# Patient Record
Sex: FEMALE | Employment: PART TIME | URBAN - METROPOLITAN AREA
[De-identification: names, ages, dates, MRNs, and addresses within clinical notes are randomized per-mention and may not be internally consistent; named-entity substitution may affect disease eponyms.]

---

## 2021-10-19 ENCOUNTER — OFFICE VISIT (OUTPATIENT)
Dept: INTERNAL MEDICINE CLINIC | Age: 66
End: 2021-10-19
Payer: MEDICARE

## 2021-10-19 VITALS
OXYGEN SATURATION: 96 % | WEIGHT: 228 LBS | HEART RATE: 74 BPM | TEMPERATURE: 98.2 F | BODY MASS INDEX: 36.64 KG/M2 | RESPIRATION RATE: 16 BRPM | SYSTOLIC BLOOD PRESSURE: 139 MMHG | HEIGHT: 66 IN | DIASTOLIC BLOOD PRESSURE: 79 MMHG

## 2021-10-19 DIAGNOSIS — Z11.59 NEED FOR HEPATITIS C SCREENING TEST: ICD-10-CM

## 2021-10-19 DIAGNOSIS — T46.6X5A MYALGIA DUE TO STATIN: ICD-10-CM

## 2021-10-19 DIAGNOSIS — E66.01 CLASS 2 SEVERE OBESITY WITH SERIOUS COMORBIDITY AND BODY MASS INDEX (BMI) OF 37.0 TO 37.9 IN ADULT, UNSPECIFIED OBESITY TYPE (HCC): ICD-10-CM

## 2021-10-19 DIAGNOSIS — E78.5 HYPERLIPIDEMIA, UNSPECIFIED HYPERLIPIDEMIA TYPE: ICD-10-CM

## 2021-10-19 DIAGNOSIS — M17.11 PRIMARY OSTEOARTHRITIS OF RIGHT KNEE: ICD-10-CM

## 2021-10-19 DIAGNOSIS — Z12.31 ENCOUNTER FOR SCREENING MAMMOGRAM FOR MALIGNANT NEOPLASM OF BREAST: ICD-10-CM

## 2021-10-19 DIAGNOSIS — R01.1 SYSTOLIC MURMUR: ICD-10-CM

## 2021-10-19 DIAGNOSIS — Z87.39 HISTORY OF MYALGIA DUE TO HMG COA REDUCTASE INHIBITOR: ICD-10-CM

## 2021-10-19 DIAGNOSIS — E11.9 TYPE 2 DIABETES MELLITUS WITHOUT COMPLICATION, WITHOUT LONG-TERM CURRENT USE OF INSULIN (HCC): Primary | ICD-10-CM

## 2021-10-19 DIAGNOSIS — M79.10 MYALGIA DUE TO STATIN: ICD-10-CM

## 2021-10-19 DIAGNOSIS — M19.012 ARTHRITIS OF LEFT SHOULDER REGION: ICD-10-CM

## 2021-10-19 PROBLEM — Z78.9 STATIN INTOLERANCE: Status: ACTIVE | Noted: 2021-10-19

## 2021-10-19 LAB
ALBUMIN SERPL-MCNC: 3.7 G/DL (ref 3.5–5)
ALBUMIN/GLOB SERPL: 1 {RATIO} (ref 1.1–2.2)
ALP SERPL-CCNC: 116 U/L (ref 45–117)
ALT SERPL-CCNC: 46 U/L (ref 12–78)
ANION GAP SERPL CALC-SCNC: 5 MMOL/L (ref 5–15)
AST SERPL-CCNC: 32 U/L (ref 15–37)
BILIRUB SERPL-MCNC: 0.3 MG/DL (ref 0.2–1)
BUN SERPL-MCNC: 16 MG/DL (ref 6–20)
BUN/CREAT SERPL: 20 (ref 12–20)
CALCIUM SERPL-MCNC: 9.5 MG/DL (ref 8.5–10.1)
CHLORIDE SERPL-SCNC: 102 MMOL/L (ref 97–108)
CHOLEST SERPL-MCNC: 194 MG/DL
CO2 SERPL-SCNC: 29 MMOL/L (ref 21–32)
CREAT SERPL-MCNC: 0.82 MG/DL (ref 0.55–1.02)
GLOBULIN SER CALC-MCNC: 3.6 G/DL (ref 2–4)
GLUCOSE SERPL-MCNC: 227 MG/DL (ref 65–100)
HBA1C MFR BLD HPLC: 7.7 %
HDLC SERPL-MCNC: 51 MG/DL
HDLC SERPL: 3.8 {RATIO} (ref 0–5)
LDLC SERPL CALC-MCNC: 89.8 MG/DL (ref 0–100)
POTASSIUM SERPL-SCNC: 4.1 MMOL/L (ref 3.5–5.1)
PROT SERPL-MCNC: 7.3 G/DL (ref 6.4–8.2)
SODIUM SERPL-SCNC: 136 MMOL/L (ref 136–145)
TRIGL SERPL-MCNC: 266 MG/DL (ref ?–150)
VLDLC SERPL CALC-MCNC: 53.2 MG/DL

## 2021-10-19 PROCEDURE — 83036 HEMOGLOBIN GLYCOSYLATED A1C: CPT | Performed by: INTERNAL MEDICINE

## 2021-10-19 PROCEDURE — 1101F PT FALLS ASSESS-DOCD LE1/YR: CPT | Performed by: INTERNAL MEDICINE

## 2021-10-19 PROCEDURE — G8400 PT W/DXA NO RESULTS DOC: HCPCS | Performed by: INTERNAL MEDICINE

## 2021-10-19 PROCEDURE — G8427 DOCREV CUR MEDS BY ELIG CLIN: HCPCS | Performed by: INTERNAL MEDICINE

## 2021-10-19 PROCEDURE — G8419 CALC BMI OUT NRM PARAM NOF/U: HCPCS | Performed by: INTERNAL MEDICINE

## 2021-10-19 PROCEDURE — 3051F HG A1C>EQUAL 7.0%<8.0%: CPT | Performed by: INTERNAL MEDICINE

## 2021-10-19 PROCEDURE — G9899 SCRN MAM PERF RSLTS DOC: HCPCS | Performed by: INTERNAL MEDICINE

## 2021-10-19 PROCEDURE — 99204 OFFICE O/P NEW MOD 45 MIN: CPT | Performed by: INTERNAL MEDICINE

## 2021-10-19 PROCEDURE — 3017F COLORECTAL CA SCREEN DOC REV: CPT | Performed by: INTERNAL MEDICINE

## 2021-10-19 PROCEDURE — G8510 SCR DEP NEG, NO PLAN REQD: HCPCS | Performed by: INTERNAL MEDICINE

## 2021-10-19 PROCEDURE — 1090F PRES/ABSN URINE INCON ASSESS: CPT | Performed by: INTERNAL MEDICINE

## 2021-10-19 PROCEDURE — G8536 NO DOC ELDER MAL SCRN: HCPCS | Performed by: INTERNAL MEDICINE

## 2021-10-19 PROCEDURE — 2022F DILAT RTA XM EVC RTNOPTHY: CPT | Performed by: INTERNAL MEDICINE

## 2021-10-19 RX ORDER — ACETAMINOPHEN 325 MG/1
TABLET ORAL
COMMUNITY

## 2021-10-19 RX ORDER — GLIPIZIDE 5 MG/1
5 TABLET ORAL
COMMUNITY
Start: 2021-05-20 | End: 2022-01-03 | Stop reason: SDUPTHER

## 2021-10-19 RX ORDER — LEVOCETIRIZINE DIHYDROCHLORIDE 5 MG/1
TABLET, FILM COATED ORAL
COMMUNITY

## 2021-10-19 NOTE — PROGRESS NOTES
A/P:  Fab Lira is a 76 y.o. female, she presents today for:    1. Type 2 diabetes mellitus without complication, without long-term current use of insulin (HCC)  -     AMB POC HEMOGLOBIN L2N  -     METABOLIC PANEL, COMPREHENSIVE; Future  2. Hyperlipidemia, unspecified hyperlipidemia type  -     LIPID PANEL; Future  3. Statin intolerance  4. Need for hepatitis C screening test  -     HCV AB W/RFLX TO CANDE; Future  5. Encounter for screening mammogram for malignant neoplasm of breast  -     MED MAMMO BI SCREENING INCL CAD; Future  6. Systolic murmur  -     ECHO ADULT COMPLETE; Future  7. Arthritis of left shoulder region  -     REFERRAL TO ORTHOPEDICS  8. Class 2 severe obesity with serious comorbidity and body mass index (BMI) of 37.0 to 37.9 in adult, unspecified obesity type ([address]  9. Primary osteoarthritis of right knee      Diabetes: managed with glipizide at this time. - reports that she had diarrhea with metformin does not want to try again. - cost of Rybelsus high   - continue glipzide - but discussed goal of changing to help reduce high and low (patient with lows on 10 mg of glipizide) to request formulary from insurance. - recently saw eye doctor - advised to see specialist - has a referral    Heart murmur: suspect valvular disorder, possible aortic stenosis - echocardiogram requested. Elevated blood pressure:  Not HTN. Not on medication monitor. HLD:    - statin intolerance - due to pain. Refuses to try more. Currently taking fish oil. OA of left shoulder - ref to ortho    OA or right knee - severe with deformity - decines ref to ortho. Prev:   cancer screening. colonoscopy - April 2013 - advised 10 year follow-up   Mammogram - done in 2020 - thinks she is due. Pap test done in past 5 years, normal   Tobacco: stopped  6 years ago. Overall pack history of around   Etoh: states she drinks less than 7 total per week and not more than 3 in 1 day.    Bone density test reported as \"normal\" by patient done around 2020. Vaccines: not sure if she had pneumonia. Follow-up and Dispositions    · Return in about 3 months (around 1/19/2022) for medicare wellness, follow-up diabetes. HPI    Labs from 10/3 show normal CBC, b[,, trop, BNP  XR of left shoulder with bone spurs  CXR wnl. Moved to [place] from [place] around June 2021  Her daughters live in town. Currently living with [facility] and her son. 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 Pain moving arm - it is really hard to move due to pain. (Left side). - no prior treatment. Also with arthritis more in right knee compared to left. Morning blood sugar this morning 174. PMH/PSH: reviewed and updated  Sochx/Famhx: reviewed and updated     All: Allergies   Allergen Reactions    Penicillins Anaphylaxis and Hives    Pravastatin Myalgia     Med:   Current Outpatient Medications   Medication Sig    glipiZIDE (GLUCOTROL) 5 mg tablet Take 5 mg by mouth.  levocetirizine (Xyzal) 5 mg tablet Xyzal 5 mg tablet   Take 1 tablet every day by oral route.  acetaminophen (TylenoL) 325 mg tablet Take  by mouth every four (4) hours as needed for Pain. No current facility-administered medications for this visit. ROS pertinent for the following:  Review of Systems   Constitutional: Negative for chills, fever and malaise/fatigue. HENT: Positive for congestion. Respiratory: Negative for cough, shortness of breath and wheezing. Cardiovascular: Negative for chest pain. Gastrointestinal: Negative for abdominal pain and vomiting. Genitourinary: Negative for dysuria. Musculoskeletal: Negative for myalgias. Skin: Negative for rash. Neurological: Negative for dizziness and headaches. Psychiatric/Behavioral: Negative for depression.        PE:  Blood pressure 139/79, pulse 74, temperature 98.2 °F (36.8 °C), temperature source Oral, resp. rate 16, height 5' 5.5\" (1.664 m), weight 228 lb (103.4 kg), SpO2 96 %. Body mass index is 37.36 kg/m². Physical Exam  Vitals and nursing note reviewed. Constitutional:       General: She is not in acute distress. Appearance: Normal appearance. She is obese. HENT:      Head: Normocephalic and atraumatic. Right Ear: Tympanic membrane normal.      Left Ear: Tympanic membrane normal.      Nose: Nose normal.      Mouth/Throat:      Mouth: Mucous membranes are moist.   Eyes:      Extraocular Movements: Extraocular movements intact. Conjunctiva/sclera: Conjunctivae normal.      Pupils: Pupils are equal, round, and reactive to light. Cardiovascular:      Rate and Rhythm: Normal rate and regular rhythm. Heart sounds: Murmur (systolic) heard. Pulmonary:      Effort: Pulmonary effort is normal.      Breath sounds: Normal breath sounds. Musculoskeletal:         General: Normal range of motion. Cervical back: Normal range of motion and neck supple. Comments: limited rom of left shoulder. Right knee with genu valgus deformity. Hypertrophy of joint. Skin:     General: Skin is warm and dry. Neurological:      Mental Status: She is alert and oriented to person, place, and time. Labs:   See addendum for interpretation of labs resulting after time of visit. Results for orders placed or performed in visit on 10/19/21   AMB POC HEMOGLOBIN A1C   Result Value Ref Range    Hemoglobin A1c (POC) 7.7 %     She was given AVS and expressed understanding with the diagnosis and plan as discussed. An electronic signature was used to authenticate this note.   -- Cali Peng MD

## 2021-10-19 NOTE — PATIENT INSTRUCTIONS
1. Please call and schedule with eye specialist    2. Please contact your insurance for the formulary of \"covered medicines\" for diabetes. 3. Schedule mammogram and echocardiogram.              Noninsulin Medicines for Type 2 Diabetes: Care Instructions  Overview     There are different types of noninsulin medicines for diabetes. Each works in a different way. But they all help you control your blood sugar. Some types help your body make insulin to lower your blood sugar. Others lower how much insulin your body needs. Some can slow how fast your body digests sugars. And some can remove extra glucose through your urine. You may need to take more than one medicine for diabetes. Two or more medicines may work better to lower your blood sugar level than just one does. · Metformin. This lowers how much glucose your liver makes. And it helps you respond better to insulin. It also lowers the amount of stored sugar that your liver releases when you are not eating. · Sulfonylureas. These help your body release more insulin. Some work for many hours. They can cause low blood sugar if you don't eat as you planned. An example is glipizide. · Thiazolidinediones. These reduce the amount of blood glucose. They also help you respond better to insulin. An example is pioglitazone. · SGLT2 inhibitors. These help to remove extra glucose through your urine. They may also help some people lose weight. An example is ertugliflozin. · DPP-4 inhibitors. These help your body raise the level of insulin after you eat. They also help your body make less of a hormone that raises blood sugar. An example is alogliptin. · GLP-1 receptor agonists. These help your body make a protein that can raise your insulin level and make you less hungry. They're given as shots or pills. An example is semaglutide. · Meglitinides. These help your body release insulin. They also help slow how your body digests sugars.  So they can keep your blood sugar from rising too fast after you eat. · Alpha-glucosidase inhibitors. These keep starches from breaking down. This means that they lower the amount of glucose absorbed when you eat. They don't help your body make more insulin. So they will not cause low blood sugar unless you use them with other medicines for diabetes. Follow-up care is a key part of your treatment and safety. Be sure to make and go to all appointments, and call your doctor if you are having problems. It's also a good idea to know your test results and keep a list of the medicines you take. How can you care for yourself at home? · Eat a healthy diet. Get some exercise each day. This may help you to reduce how much medicine you need. · Do not take other prescription or over-the-counter medicines, vitamins, herbal products, or supplements without talking to your doctor first. Some medicines for type 2 diabetes can cause problems with other medicines or supplements. · Tell your doctor if you plan to get pregnant. Some of these drugs are not safe for pregnant women. · Be safe with medicines. Take your medicines exactly as prescribed. Meglitinides and sulfonylureas can cause your blood sugar to drop very low. Call your doctor if you think you are having a problem with your medicine. · Check your blood sugar often. You can use a glucose monitor. Keeping track can help you know how certain foods, activities, and medicines affect your blood sugar. And it can help you keep your blood sugar from getting so low that it's not safe. When should you call for help? Call 911 anytime you think you may need emergency care. For example, call if:    · You passed out (lost consciousness).     · You are confused or cannot think clearly.     · Your blood sugar is very high or very low.    Watch closely for changes in your health, and be sure to contact your doctor if:    · Your blood sugar stays outside the level your doctor set for you.     · You have any problems. Where can you learn more? Go to http://www.gray.com/  Enter H153 in the search box to learn more about \"Noninsulin Medicines for Type 2 Diabetes: Care Instructions. \"  Current as of: August 31, 2020               Content Version: 13.0  © 8263-7493 [employer], [employer]orporated. Care instructions adapted under license by [employer] (which disclaims liability or warranty for this information). If you have questions about a medical condition or this instruction, always ask your healthcare professional. [address] any warranty or liability for your use of this information.

## 2021-10-19 NOTE — PROGRESS NOTES
RM  3    Patient reports she is a Jehovah witness     Chief Complaint   Patient presents with   [address] [facility] [address]     new patient establishing care,       There were no vitals taken for this visit. No flowsheet data found. 1. Have you been to the ER, urgent care clinic since your last visit? Hospitalized since your last visit? No    2. Have you seen or consulted any other health care providers outside of the [address] [place] [address] since your last visit? Include any pap smears or colon screening. [facility] [phone] Deborra Waldoe, DO    Health Maintenance Due   Topic Date Due    Hepatitis C Screening  Never done    DTaP/Tdap/Td series (1 - Tdap) Never done    Lipid Screen  Never done    Colorectal Cancer Screening Combo  Never done    Shingrix Vaccine Age 50> (1 of 2) Never done    Breast Cancer Screen Mammogram  Never done    Bone Densitometry (Dexa) Screening  Never done    Pneumococcal 65+ years (1 of 1 - PPSV23) Never done    Medicare Yearly Exam  Never done       No flowsheet data found. Patient got flu vaccine at [address] last Friday   AVS  education, follow up, and recommendations provided and addressed with patient.   services used to advise patient no

## 2021-10-20 ENCOUNTER — TELEPHONE (OUTPATIENT)
Dept: PHARMACY | Age: 66
End: 2021-10-20

## 2021-10-20 NOTE — TELEPHONE ENCOUNTER
Kayode Lorenzana MD, from below, had 1st appt with you 10/19/21  - Chart reviewed d/t insurer-identified gap: diabetes rx claims and no statin rx claim - as you noted, myalgias with pravastatin and currently refuses another statin  · If appropriate, please consider addending yesterday OV to include \"myalgia due to statin\" diagnosis (ICD-10 codes: M79.10 and D80.1P8G) within the 10/19/21 appt to complete/close this gap and close any further statin therapy reminders to you or patient this year. Also noted patient to try to provide you with covered DM medication options; if this helps, per [facility] website, formulary [place] [id number] for the [facility] plans (which it appears she has), covered diabetes medications appear to include:  · Pioglitazone - Tier 1  · DPP4s: Januvia and Tradjenta - Tier 3 (Onglyza - Tier 4)  · SGLT2s: Jardiance and Invokana - Tier 3 (Farxiga - Tier 4)  · HLF0X: Trulicity, Victoza, Ozempic and Ryblesus - Tier 3 (Bydureon - Tier 4)  *Note: appears patient reported Rybelsus cost was too high (as above, Rybelsus is Tier 3 - website does not show what Tier 3 copay is, if copay is high, or if patient may be in coverage gap?)    Thank you,  Errlo Pinto, PharmD, [facility], toll free: [phone], option 2  ==============================================================  [facility]: STATIN THERAPY REVIEW  Identified statin use in persons with diabetes care gap per [facility] [employer] Records dated: 10/10/21. Last Visit: 10/19/21 - 1st visit in this EMR and new to PCP    ASSESSMENT  STATIN GAP IDENTIFIED    Per chart review  Allergies   Allergen Reactions    Penicillins Anaphylaxis and Hives    Pravastatin Myalgia      10/19/21 PCP OV note: \"statin intolerance - due to pain. Refuses to try more. \"    Lab Results   Component Value Date/Time    Cholesterol, total 194 10/19/2021 11:52 AM    HDL Cholesterol 51 10/19/2021 11:52 AM LDL, calculated 89.8 10/19/2021 11:52 AM    VLDL, calculated 53.2 10/19/2021 11:52 AM    Triglyceride 266 (H) 10/19/2021 11:52 AM    CHOL/HDL Ratio 3.8 10/19/2021 11:52 AM     ALT (SGPT)   Date Value Ref Range Status   10/19/2021 46 12 - 78 U/L Final     AST (SGOT)   Date Value Ref Range Status   10/19/2021 32 15 - 37 U/L Final     The 10-year ASCVD risk score (Myah Merino., et al., 2013) is: 13.7%    Values used to calculate the score:      Age: 76 years      Sex: Female      Is Non- : No      Diabetic: Yes      Tobacco smoker: No      Systolic Blood Pressure: 133 mmHg      Is BP treated: No      HDL Cholesterol: 51 MG/DL      Total Cholesterol: 194 MG/DL

## 2021-10-23 LAB
HCV AB S/CO SERPL IA: 2.1 S/CO RATIO (ref 0–0.9)
HCV RNA SERPL QL NAA+PROBE: NEGATIVE
HCV RNA SERPL QL NAA+PROBE: NORMAL

## 2021-10-26 NOTE — TELEPHONE ENCOUNTER
Noted \"myalgia d/t statin\" added to 10/19 visit, thank you for reviewing!    =========================================================   For Pharmacy Admin Tracking Only     CPA in place: No   Recommendation Provided To: Provider: 1 via Note to Provider    Gap Closed?: Yes   Intervention Accepted By: Provider: 1   Time Spent (min): 25 right upper arm

## 2021-10-26 NOTE — PROGRESS NOTES
Labs overall stable:     CMP: normal liver and kidney function. - Glucose very elevated. Lipid: overall good balance of cholesterol. HCV: screening for hep C is negative.      Lab letter generated

## 2021-12-20 ENCOUNTER — HOSPITAL ENCOUNTER (OUTPATIENT)
Dept: MAMMOGRAPHY | Age: 66
Discharge: HOME | End: 2021-12-20
Attending: INTERNAL MEDICINE
Payer: MEDICARE

## 2021-12-20 ENCOUNTER — HOSPITAL ENCOUNTER (OUTPATIENT)
Dept: NON INVASIVE DIAGNOSTICS | Age: 66
Discharge: HOME | End: 2021-12-20
Attending: INTERNAL MEDICINE
Payer: MEDICARE

## 2021-12-20 VITALS
DIASTOLIC BLOOD PRESSURE: 78 MMHG | SYSTOLIC BLOOD PRESSURE: 138 MMHG | BODY MASS INDEX: 36.64 KG/M2 | HEIGHT: 66 IN | WEIGHT: 228 LBS

## 2021-12-20 DIAGNOSIS — Z12.31 ENCOUNTER FOR SCREENING MAMMOGRAM FOR MALIGNANT NEOPLASM OF BREAST: ICD-10-CM

## 2021-12-20 DIAGNOSIS — R01.1 SYSTOLIC MURMUR: ICD-10-CM

## 2021-12-20 LAB
ECHO AO ASC DIAM: 3 CM
ECHO AO ASCENDING AORTA INDEX: 1.43 CM/M2
ECHO AV AREA PEAK VELOCITY: 1.9 CM2
ECHO AV AREA PEAK VELOCITY: 1.9 CM2
ECHO AV AREA VTI: 2 CM2
ECHO AV AREA VTI: 2 CM2
ECHO AV MEAN GRADIENT: 6 MMHG
ECHO AV MEAN VELOCITY: 1.1 M/S
ECHO AV PEAK GRADIENT: 11 MMHG
ECHO AV PEAK VELOCITY: 1.7 M/S
ECHO AV VELOCITY RATIO: 0.65
ECHO AV VTI: 38.4 CM
ECHO LA DIAMETER INDEX: 1.52 CM/M2
ECHO LA DIAMETER: 3.2 CM
ECHO LA VOL 2C: 48 ML (ref 22–52)
ECHO LA VOL 4C: 66 ML (ref 22–52)
ECHO LA VOL BP: 57 ML (ref 22–52)
ECHO LA VOL BP: 57 ML (ref 22–52)
ECHO LA VOLUME AREA LENGTH: 62 ML
ECHO LA VOLUME INDEX A2C: 23 ML/M2 (ref 16–28)
ECHO LA VOLUME INDEX A4C: 31 ML/M2 (ref 16–28)
ECHO LA VOLUME INDEX AREA LENGTH: 30 ML/M2
ECHO LV E' LATERAL VELOCITY: 8 CM/S
ECHO LV E' SEPTAL VELOCITY: 7 CM/S
ECHO LV FRACTIONAL SHORTENING: 40 % (ref 28–44)
ECHO LV INTERNAL DIMENSION DIASTOLE INDEX: 1.67 CM/M2
ECHO LV INTERNAL DIMENSION DIASTOLIC: 3.5 CM (ref 3.9–5.3)
ECHO LV INTERNAL DIMENSION SYSTOLIC INDEX: 1 CM/M2
ECHO LV INTERNAL DIMENSION SYSTOLIC: 2.1 CM
ECHO LV IVSD: 1.2 CM (ref 0.6–0.9)
ECHO LV MASS 2D: 135.8 G (ref 67–162)
ECHO LV MASS INDEX 2D: 64.7 G/M2 (ref 43–95)
ECHO LV POSTERIOR WALL DIASTOLIC: 1.2 CM (ref 0.6–0.9)
ECHO LV RELATIVE WALL THICKNESS RATIO: 0.69
ECHO LVOT AREA: 3.1 CM2
ECHO LVOT AV VTI INDEX: 0.66
ECHO LVOT DIAM: 2 CM
ECHO LVOT MEAN GRADIENT: 3 MMHG
ECHO LVOT PEAK GRADIENT: 5 MMHG
ECHO LVOT PEAK VELOCITY: 1.1 M/S
ECHO LVOT STROKE VOLUME INDEX: 38 ML/M2
ECHO LVOT SV: 79.8 ML
ECHO LVOT VTI: 25.4 CM
ECHO MV A VELOCITY: 1.12 M/S
ECHO MV E DECELERATION TIME (DT): 221.6 MS
ECHO MV E VELOCITY: 0.92 M/S
ECHO MV E/A RATIO: 0.82
ECHO MV E/E' LATERAL: 11.5
ECHO MV E/E' RATIO (AVERAGED): 12.32
ECHO MV E/E' SEPTAL: 13.14
ECHO PULMONARY ARTERY END DIASTOLIC PRESSURE: 5 MMHG
ECHO PV MAX VELOCITY: 0.9 M/S
ECHO PV PEAK GRADIENT: 3 MMHG
ECHO PV REGURGITANT MAX VELOCITY: 1.1 M/S
ECHO RV INTERNAL DIMENSION: 4.2 CM
ECHO RV TAPSE: 2.1 CM (ref 1.5–2)
ECHO TV REGURGITANT MAX VELOCITY: 1.97 M/S
ECHO TV REGURGITANT PEAK GRADIENT: 16 MMHG

## 2021-12-20 PROCEDURE — 77067 SCR MAMMO BI INCL CAD: CPT

## 2021-12-20 PROCEDURE — 93306 TTE W/DOPPLER COMPLETE: CPT | Performed by: SPECIALIST

## 2021-12-20 PROCEDURE — 93306 TTE W/DOPPLER COMPLETE: CPT

## 2021-12-22 NOTE — PROGRESS NOTES
Mild aortic sclerosis is not problematic. Occassionally over time this can progress to narrowing of the valve, but good blood pressure control will help prevent this. Congratulations on a good echocardiogram of the heart. Please keep follow-up appointment to discuss this and possibly adjust blood pressure medication.

## 2022-01-03 DIAGNOSIS — E66.01 CLASS 2 SEVERE OBESITY WITH SERIOUS COMORBIDITY AND BODY MASS INDEX (BMI) OF 37.0 TO 37.9 IN ADULT, UNSPECIFIED OBESITY TYPE (HCC): Primary | ICD-10-CM

## 2022-01-04 NOTE — TELEPHONE ENCOUNTER
Historical medication: Glucotrol 5 mg     Last visit 10/19/2021 MD Radha Early   Next appointment 01/19/2022     Requested Prescriptions     Pending Prescriptions Disp Refills    glipiZIDE (GLUCOTROL) 5 mg tablet

## 2022-01-05 RX ORDER — GLIPIZIDE 5 MG/1
5 TABLET ORAL
Qty: 30 TABLET | Refills: 1 | Status: SHIPPED | OUTPATIENT
Start: 2022-01-05 | End: 2022-03-07

## 2022-01-26 ENCOUNTER — TELEPHONE (OUTPATIENT)
Dept: PHARMACY | Age: 67
End: 2022-01-26

## 2022-01-26 NOTE — TELEPHONE ENCOUNTER
Sridevi Guerra MD, from below, appt with you 1/27/22  - Chart reviewed d/t insurer-identified gap: diabetes rx claims and no statin rx claim  - As you noted in Oct, statin intolerance and diagnosis \"myalgia due to statin\"    If appropriate, please consider using one of the following CMS allowable diagnoses within visit encounter:   Myalgia due to statin (M79.10, T46.6X5A)    Statin myopathy (G72.0)  - This will complete/close this insurer-identified gap for this calendar year    Thank you,  Nguyễn Pinto, PharmD, [facility], toll free: [phone], option 2     ==============================================================  [facility]: STATIN THERAPY REVIEW  Identified statin use in persons with diabetes care gap per [facility]     [facility] IDENTIFIED    Lab Results   Component Value Date/Time    Cholesterol, total 194 10/19/2021 11:52 AM    HDL Cholesterol 51 10/19/2021 11:52 AM    LDL, calculated 89.8 10/19/2021 11:52 AM    VLDL, calculated 53.2 10/19/2021 11:52 AM    Triglyceride 266 (H) 10/19/2021 11:52 AM    CHOL/HDL Ratio 3.8 10/19/2021 11:52 AM     ALT (SGPT)   Date Value Ref Range Status   10/19/2021 46 12 - 78 U/L Final     AST (SGOT)   Date Value Ref Range Status   10/19/2021 32 15 - 37 U/L Final     The 10-year ASCVD risk score (Howard Ndiaye, et al., 2013) is: 15%    Values used to calculate the score:      Age: 78 years      Sex: Female      Is Non- : No      Diabetic: Yes      Tobacco smoker: No      Systolic Blood Pressure: 744 mmHg      Is BP treated: No      HDL Cholesterol: 51 MG/DL      Total Cholesterol: 194 MG/DL     Per chart review  10/19/21 PCP OV: notes \"statin intolerance - due to pain.  Refuses to try more\" and \"Myalgia due to statin\" diagnosis    PLAN  - Continue to monitor   - Consider CMS allowable diagnosis code to close payor-identified statin gap    Future Appointments   Date Time Provider Karen Denis   1/27/2022  2:45 PM Lucretia Sánchez MD [facility]

## 2022-01-27 ENCOUNTER — OFFICE VISIT (OUTPATIENT)
Dept: INTERNAL MEDICINE CLINIC | Age: 67
End: 2022-01-27
Payer: MEDICARE

## 2022-01-27 VITALS
DIASTOLIC BLOOD PRESSURE: 85 MMHG | SYSTOLIC BLOOD PRESSURE: 127 MMHG | RESPIRATION RATE: 16 BRPM | WEIGHT: 220.4 LBS | TEMPERATURE: 97.7 F | HEIGHT: 66 IN | BODY MASS INDEX: 35.42 KG/M2 | HEART RATE: 83 BPM | OXYGEN SATURATION: 94 %

## 2022-01-27 DIAGNOSIS — M79.10 MYALGIA DUE TO STATIN: ICD-10-CM

## 2022-01-27 DIAGNOSIS — E66.01 CLASS 2 SEVERE OBESITY WITH SERIOUS COMORBIDITY AND BODY MASS INDEX (BMI) OF 37.0 TO 37.9 IN ADULT, UNSPECIFIED OBESITY TYPE (HCC): ICD-10-CM

## 2022-01-27 DIAGNOSIS — S90.851A FOREIGN BODY IN RIGHT FOOT, INITIAL ENCOUNTER: ICD-10-CM

## 2022-01-27 DIAGNOSIS — E66.01 SEVERE OBESITY (BMI 35.0-35.9 WITH COMORBIDITY) (HCC): ICD-10-CM

## 2022-01-27 DIAGNOSIS — Z00.00 MEDICARE ANNUAL WELLNESS VISIT, SUBSEQUENT: Primary | ICD-10-CM

## 2022-01-27 DIAGNOSIS — E78.5 HYPERLIPIDEMIA, UNSPECIFIED HYPERLIPIDEMIA TYPE: ICD-10-CM

## 2022-01-27 DIAGNOSIS — E11.65 TYPE 2 DIABETES MELLITUS WITH HYPERGLYCEMIA, WITHOUT LONG-TERM CURRENT USE OF INSULIN (HCC): ICD-10-CM

## 2022-01-27 DIAGNOSIS — T46.6X5A MYALGIA DUE TO STATIN: ICD-10-CM

## 2022-01-27 DIAGNOSIS — Z23 ENCOUNTER FOR IMMUNIZATION: ICD-10-CM

## 2022-01-27 LAB
ALBUMIN UR QL STRIP: 30 MG/L
CREATININE, URINE POC: 200 MG/DL
HBA1C MFR BLD HPLC: 9.7 % (ref 4.8–5.6)
MICROALBUMIN/CREAT RATIO POC: <30 MG/G

## 2022-01-27 PROCEDURE — 99214 OFFICE O/P EST MOD 30 MIN: CPT | Performed by: INTERNAL MEDICINE

## 2022-01-27 PROCEDURE — 82044 UR ALBUMIN SEMIQUANTITATIVE: CPT | Performed by: INTERNAL MEDICINE

## 2022-01-27 PROCEDURE — 90732 PPSV23 VACC 2 YRS+ SUBQ/IM: CPT | Performed by: INTERNAL MEDICINE

## 2022-01-27 PROCEDURE — G0439 PPPS, SUBSEQ VISIT: HCPCS | Performed by: INTERNAL MEDICINE

## 2022-01-27 PROCEDURE — G0009 ADMIN PNEUMOCOCCAL VACCINE: HCPCS | Performed by: INTERNAL MEDICINE

## 2022-01-27 PROCEDURE — 83036 HEMOGLOBIN GLYCOSYLATED A1C: CPT | Performed by: INTERNAL MEDICINE

## 2022-01-27 RX ORDER — BISMUTH SUBSALICYLATE 262 MG
1 TABLET,CHEWABLE ORAL DAILY
COMMUNITY

## 2022-01-27 NOTE — PROGRESS NOTES
A/P:  Merrie Shone is a 78 y.o. female, she presents today for:    1. Type 2 diabetes mellitus without complication, without long-term current use of insulin ([facility])  -     AMB POC HEMOGLOBIN A1C  -     AMB POC URINE, MICROALBUMIN, SEMIQUANT (3 RESULTS)  -      DIABETES FOOT EXAM  -     semaglutide (Rybelsus) 3 mg tablet; Take 1 Tablet by mouth Daily (before breakfast). , Normal, Disp-30 Tablet, R-0  -     semaglutide (Rybelsus) 7 mg tablet; Take 1 Tablet by mouth Daily (before breakfast). , Normal, Disp-30 Tablet, R-5  2. Medicare annual wellness visit, subsequent  3. Encounter for immunization  -     PNEUMOCOCCAL POLYSACCHARIDE VACCINE, 23-VALENT, ADULT OR IMMUNOSUPPRESSED PT DOSE,  -     ADMIN PNEUMOCOCCAL VACCINE  4. Severe obesity (BMI 35.0-35.9 with comorbidity) ([facility]  5. Foreign body in right foot, initial encounter  -     REFERRAL TO PODIATRY  6. Myalgia due to statin    Uncontrolled Diabetes: managed with glipizide at this time. - reports that she had diarrhea with metformin does not want to try again. - start rybelsus - semaglutide - notes cost will be under 50 dollars per month with is doable . - recently saw eye doctor - advised to see specialist - has a referral   - not on statin due to statin myalgia. Foreign body in foot: no sign of infection -     Well woman (non-gyn) exam: history and exam revealed issues as noted below. Cancer screening:    - breast: normal mammogram 12/2021   - cervical: reports prior normal screenings. - colon: 2013 - April - colonoscopy - polyps found - advised 10 follow-up. - tobacco: Less than 15 pack year history. - etoh: none. Vaccine status: no record of ppsv, reports completing covid. Plans for booster after 6 months. Cardiovascular risk: high risk due to diabetes - discussed adding statin  Bone health: dexa done in 2020 with [employer] - result not available today.  - however patient recalls it as \"good\"  Diet and Exercise: not drinking sugary beverages. Future Appointments   Date Time Provider Karen Denis   3/7/2022  9:00 AM David Rockwell MD [facility] BS AMB       HPI    Overall doing well. Called insurance and found out co-pay for semaglutide is accessible, would like to use tablets. has been following a lower carb diet. PMH/PSH: reviewed and updated  Sochx/Famhx: reviewed and updated     All: Allergies   Allergen Reactions    Penicillins Anaphylaxis and Hives    Pravastatin Myalgia     Med:   Current Outpatient Medications   Medication Sig    multivitamin (ONE A DAY) tablet Take 1 Tablet by mouth daily.  garlic (GARLIQUE PO) Take  by mouth.  glipiZIDE (GLUCOTROL) 5 mg tablet Take 1 Tablet by mouth daily (with breakfast).  levocetirizine (Xyzal) 5 mg tablet Xyzal 5 mg tablet   Take 1 tablet every day by oral route.  acetaminophen (TylenoL) 325 mg tablet Take  by mouth every four (4) hours as needed for Pain. No current facility-administered medications for this visit. ROS pertinent for the following:  ROS    PE:  Blood pressure 127/85, pulse 83, temperature 97.7 °F (36.5 °C), temperature source Temporal, resp. rate 16, height 5' 5.5\" (1.664 m), weight 220 lb 6.4 oz (100 kg), SpO2 94 %. Body mass index is 36.12 kg/m². Physical Exam  Vitals and nursing note reviewed. Constitutional:       Appearance: She is obese. HENT:      Head: Normocephalic and atraumatic. Right Ear: Tympanic membrane normal.      Left Ear: Tympanic membrane normal.   Eyes:      Conjunctiva/sclera: Conjunctivae normal.      Pupils: Pupils are equal, round, and reactive to light. Cardiovascular:      Rate and Rhythm: Normal rate and regular rhythm. Pulses: Normal pulses. Heart sounds: Normal heart sounds. No murmur heard. Pulmonary:      Effort: Pulmonary effort is normal.      Breath sounds: Normal breath sounds. Musculoskeletal:      Cervical back: Normal range of motion.       Right lower leg: No edema. Left lower leg: No edema. Lymphadenopathy:      Cervical: No cervical adenopathy. Neurological:      General: No focal deficit present. Mental Status: She is oriented to person, place, and time. Psychiatric:         Mood and Affect: Mood normal.         Behavior: Behavior normal.         Diabetic foot exam:     Left: Filament test normal sensation with micro filament   Pulse DP: 2+ (normal)   Deformities: Mild - callous formation  Right: Filament test normal sensation with micro filament   Pulse DP: 2+ (normal)   Deformities: Yes - foreign body, pinpoin on pedal surfcae, no surrounding redness - patient reports caring for this already. Labs:   See addendum for interpretation of labs resulting after time of visit. Results for orders placed or performed in visit on 01/27/22   AMB POC HEMOGLOBIN A1C   Result Value Ref Range    Hemoglobin A1c (POC) 9.7 (A) 4.8 - 5.6 %         She was given AVS and expressed understanding with the diagnosis and plan as discussed. An electronic signature was used to authenticate this note. -- Lord Elva MD     This is the Subsequent Medicare Annual Wellness Exam, performed 12 months or more after the Initial AWV or the last Subsequent AWV    I have reviewed the patient's medical history in detail and updated the computerized patient record. Assessment/Plan   Education and counseling provided:  Diabetic foot care    1. Type 2 diabetes mellitus without complication, without long-term current use of insulin ([facility])  -     AMB POC HEMOGLOBIN A1C  2. Medicare annual wellness visit, subsequent  3.  Encounter for immunization     Depression Risk Factor Screening     3 most recent PHQ Screens 1/27/2022   Little interest or pleasure in doing things Not at all   Feeling down, depressed, irritable, or hopeless Not at all   Total Score PHQ 2 0     Alcohol & Drug Abuse Risk Screen    Do you average more than 1 drink per night or more than 7 drinks a week: No    On any one occasion in the past three months have you have had more than 3 drinks containing alcohol:  No          Functional Ability and Level of Safety    Hearing: Hearing is good. Activities of Daily Living: The home contains: no safety equipment. ADL Assessment 1/27/2022   Feeding yourself No Help Needed   Getting from bed to chair No Help Needed   Getting dressed No Help Needed   Bathing or showering No Help Needed   Walk across the room (includes cane/walker) No Help Needed   Using the telphone No Help Needed   Taking your medications No Help Needed   Preparing meals No Help Needed   Managing money (expenses/bills) No Help Needed   Moderately strenuous housework (laundry) No Help Needed   Shopping for personal items (toiletries/medicines) No Help Needed   Shopping for groceries No Help Needed   Driving No Help Needed   Climbing a flight of stairs No Help Needed   Getting to places beyond walking distances No Help Needed       Ambulation: with mild difficulty    - knee arthritis - has had injections. Fall Risk:  Fall Risk Assessment, last 12 mths 1/27/2022   Able to walk? Yes   Fall in past 12 months? 0   Do you feel unsteady? 1   Are you worried about falling 0   Is TUG test greater than 12 seconds?  0   Is the gait abnormal? 0      Abuse Screen:  Patient is not abused     Cognitive Screening    Has your family/caregiver stated any concerns about your memory: no    Health Maintenance Due     Health Maintenance Due   Topic Date Due    Foot Exam Q1  Never done    MICROALBUMIN Q1  Never done    Eye Exam Retinal or Dilated  Never done    DTaP/Tdap/Td series (1 - Tdap) Never done    Colorectal Cancer Screening Combo  Never done    Shingrix Vaccine Age 50> (1 of 2) Never done    Bone Densitometry (Dexa) Screening  Never done    Pneumococcal 65+ years (1 of 1 - PPSV23) Never done    COVID-19 Vaccine (2 - Geovanni Mumford 3-dose series) 11/05/2021       Patient Care Team   Patient Care Team:  Charleen Pantera Lala MD as PCP - General (Internal Medicine)  Alison Arriaga MD as PCP - [address] [place] [address] Dr Urena Provider    History     Patient Active Problem List   Diagnosis Code    Type 2 diabetes mellitus, without long-term current use of insulin ([facility] E11.9    Statin intolerance Z78.9    Hyperlipidemia K13.5    Systolic murmur Z52.1    Class 2 severe obesity with serious comorbidity and body mass index (BMI) of 37.0 to 37.9 in adult ([facility] E66.01, Z68.37    Arthritis of left shoulder region M19.012     Past Medical History:   Diagnosis Date    [facility]     type 2       Past Surgical History:   Procedure Laterality Date    HX APPENDECTOMY      HX CHOLECYSTECTOMY      HX TONSILLECTOMY       Current Outpatient Medications   Medication Sig Dispense Refill    multivitamin (ONE A DAY) tablet Take 1 Tablet by mouth daily.  garlic (GARLIQUE PO) Take  by mouth.  glipiZIDE (GLUCOTROL) 5 mg tablet Take 1 Tablet by mouth daily (with breakfast). 30 Tablet 1    levocetirizine (Xyzal) 5 mg tablet Xyzal 5 mg tablet   Take 1 tablet every day by oral route.  acetaminophen (TylenoL) 325 mg tablet Take  by mouth every four (4) hours as needed for Pain.        Allergies   Allergen Reactions    Penicillins Anaphylaxis and Hives    Pravastatin Myalgia       Family History   Problem Relation Age of Onset    Diabetes Mother     Diabetes Father     Heart Disease Father     Heart Disease Paternal Uncle      Social History     Tobacco Use    Smoking status: Former Smoker     Packs/day: 0.50     Years: 30.00     Pack years: 15.00     Types: Cigarettes     Quit date: 2015     Years since quittin.0    Smokeless tobacco: Never Used   Substance Use Topics    Alcohol use: Yes     Comment: drinks 4-5 drinks on the weekends      Karen Hdz MD

## 2022-01-27 NOTE — PATIENT INSTRUCTIONS
Medicare Wellness Visit, Female     The best way to live healthy is to have a lifestyle where you eat a well-balanced diet, exercise regularly, limit alcohol use, and quit all forms of tobacco/nicotine, if applicable. Regular preventive services are another way to keep healthy. Preventive services (vaccines, screening tests, monitoring & exams) can help personalize your care plan, which helps you manage your own care. Screening tests can find health problems at the earliest stages, when they are easiest to treat. [facility] follows the current, evidence-based guidelines published by the [employer] ([facility]) when recommending preventive services for our patients. Because we follow these guidelines, sometimes recommendations change over time as research supports it. (For example, mammograms used to be recommended annually. Even though Medicare will still pay for an annual mammogram, the newer guidelines recommend a mammogram every two years for women of average risk). Of course, you and your doctor may decide to screen more often for some diseases, based on your risk and your co-morbidities (chronic disease you are already diagnosed with). Preventive services for you include:  - Medicare offers their members a free annual wellness visit, which is time for you and your primary care provider to discuss and plan for your preventive service needs. Take advantage of this benefit every year!  -All adults over the age of 72 should receive the recommended pneumonia vaccines. Current USPSTF guidelines recommend a series of two vaccines for the best pneumonia protection.   -All adults should have a flu vaccine yearly and a tetanus vaccine every 10 years.   -All adults age 48 and older should receive the shingles vaccines (series of two vaccines).       -All adults age 38-68 who are overweight should have a diabetes screening test once every three years.   -All adults born between [birth year] and 1965 should be screened once for Hepatitis C.  -Other screening tests and preventive services for persons with diabetes include: an eye exam to screen for diabetic retinopathy, a kidney function test, a foot exam, and stricter control over your cholesterol.   -Cardiovascular screening for adults with routine risk involves an electrocardiogram (ECG) at intervals determined by your doctor.   -Colorectal cancer screenings should be done for adults age 54-65 with no increased risk factors for colorectal cancer. There are a number of acceptable methods of screening for this type of cancer. Each test has its own benefits and drawbacks. Discuss with your doctor what is most appropriate for you during your annual wellness visit. The different tests include: colonoscopy (considered the best screening method), a fecal occult blood test, a fecal DNA test, and sigmoidoscopy.    -A bone mass density test is recommended when a woman turns 65 to screen for osteoporosis. This test is only recommended one time, as a screening. Some providers will use this same test as a disease monitoring tool if you already have osteoporosis. -Breast cancer screenings are recommended every other year for women of normal risk, age 54-69.  -Cervical cancer screenings for women over age 72 are only recommended with certain risk factors.      Here is a list of your current Health Maintenance items (your personalized list of preventive services) with a due date:  Health Maintenance Due   Topic Date Due    Diabetic Foot Care  Never done    Albumin Urine Test  Never done    Eye Exam  Never done    DTaP/Tdap/Td  (1 - Tdap) Never done    Colorectal Screening  Never done    Shingles Vaccine (1 of 2) Never done    Bone Mineral Density   Never done    Pneumococcal Vaccine (1 of 1 - PPSV23) Never done    COVID-19 Vaccine (2 - Moderna 3-dose series) 11/05/2021

## 2022-01-27 NOTE — PROGRESS NOTES
RM 3    Chief Complaint   Patient presents with    Annual Wellness Visit    Diabetes     pt states that blood sugar has been elevated       Visit Vitals  /85 (BP 1 Location: Left arm, BP Patient Position: Sitting, BP Cuff Size: Adult)   Pulse 83   Temp 97.7 °F (36.5 °C) (Temporal)   Resp 16   Ht 5' 5.5\" (1.664 m)   Wt 220 lb 6.4 oz (100 kg)   SpO2 94%   BMI 36.12 kg/m²       1. Have you been to the ER, urgent care clinic since your last visit? Hospitalized since your last visit? No    2. Have you seen or consulted any other health care providers outside of the [address] [place] [address] since your last visit? Include any pap smears or colon screening. No    Health Maintenance Due   Topic Date Due    Foot Exam Q1  Never done    MICROALBUMIN Q1  Never done    Eye Exam Retinal or Dilated  Never done    DTaP/Tdap/Td series (1 - Tdap) Never done    Colorectal Cancer Screening Combo  Never done    Shingrix Vaccine Age 50> (1 of 2) Never done    Bone Densitometry (Dexa) Screening  Never done    Pneumococcal 65+ years (1 of 1 - PPSV23) Never done    Medicare Yearly Exam  Never done    COVID-19 Vaccine (2 - Moderna 3-dose series) 11/05/2021       3 most recent PHQ Screens 1/27/2022   Little interest or pleasure in doing things Not at all   Feeling down, depressed, irritable, or hopeless Not at all   Total Score PHQ 2 0     Fall Risk Assessment, last 12 mths 1/27/2022   Able to walk? Yes   Fall in past 12 months? 0   Do you feel unsteady? 1   Are you worried about falling 0   Is TUG test greater than 12 seconds? 0   Is the gait abnormal? 0     Abuse Screening Questionnaire 1/27/2022   Do you ever feel afraid of your partner? N   Are you in a relationship with someone who physically or mentally threatens you? N   Is it safe for you to go home?  Y     ADL Assessment 1/27/2022   Feeding yourself No Help Needed   Getting from bed to chair No Help Needed   Getting dressed No Help Needed   Bathing or showering No Help Needed   Walk across the room (includes cane/walker) No Help Needed   Using the telphone No Help Needed   Taking your medications No Help Needed   Preparing meals No Help Needed   Managing money (expenses/bills) No Help Needed   Moderately strenuous housework (laundry) No Help Needed   Shopping for personal items (toiletries/medicines) No Help Needed   Shopping for groceries No Help Needed   Driving No Help Needed   Climbing a flight of stairs No Help Needed   Getting to places beyond walking distances No Help Needed       Learning Assessment 10/19/2021   PRIMARY LEARNER Patient   HIGHEST LEVEL OF EDUCATION - PRIMARY LEARNER  GRADUATED HIGH SCHOOL OR GED   BARRIERS PRIMARY LEARNER NONE   CO-LEARNER CAREGIVER Yes   PRIMARY LANGUAGE ENGLISH   LEARNER PREFERENCE PRIMARY READING   ANSWERED BY patient   RELATIONSHIP SELF       AVS  education, follow up, and recommendations provided and addressed with patient.  services used to advise patient. No

## 2022-01-28 NOTE — TELEPHONE ENCOUNTER
Noted yesterday PCP OV and diagnosis \"myalgia due to statin\" - thank you for reviewing!    =========================================================   For Pharmacy [id number] in place: No   Recommendation Provided To: Provider: 1 via Note to Provider    Gap Closed?: Yes   Intervention Accepted By: Provider: 1   Time Spent (min): 15

## 2022-03-06 DIAGNOSIS — E66.01 CLASS 2 SEVERE OBESITY WITH SERIOUS COMORBIDITY AND BODY MASS INDEX (BMI) OF 37.0 TO 37.9 IN ADULT, UNSPECIFIED OBESITY TYPE (HCC): ICD-10-CM

## 2022-03-07 RX ORDER — GLIPIZIDE 5 MG/1
TABLET ORAL
Qty: 30 TABLET | Refills: 0 | Status: SHIPPED | OUTPATIENT
Start: 2022-03-07 | End: 2022-03-30 | Stop reason: SDUPTHER

## 2022-03-18 PROBLEM — M79.10 MYALGIA DUE TO STATIN: Status: ACTIVE | Noted: 2021-10-19

## 2022-03-18 PROBLEM — T46.6X5A MYALGIA DUE TO STATIN: Status: ACTIVE | Noted: 2021-10-19

## 2022-03-19 PROBLEM — M19.012 ARTHRITIS OF LEFT SHOULDER REGION: Status: ACTIVE | Noted: 2021-10-19

## 2022-03-19 PROBLEM — E78.5 HYPERLIPIDEMIA: Status: ACTIVE | Noted: 2021-10-19

## 2022-03-19 PROBLEM — R01.1 SYSTOLIC MURMUR: Status: ACTIVE | Noted: 2021-10-19

## 2022-03-19 PROBLEM — E66.01 CLASS 2 SEVERE OBESITY WITH SERIOUS COMORBIDITY AND BODY MASS INDEX (BMI) OF 37.0 TO 37.9 IN ADULT (HCC): Status: ACTIVE | Noted: 2021-10-19

## 2022-03-19 PROBLEM — E66.812 CLASS 2 SEVERE OBESITY WITH SERIOUS COMORBIDITY AND BODY MASS INDEX (BMI) OF 37.0 TO 37.9 IN ADULT (HCC): Status: ACTIVE | Noted: 2021-10-19

## 2022-03-30 ENCOUNTER — OFFICE VISIT (OUTPATIENT)
Dept: INTERNAL MEDICINE CLINIC | Age: 67
End: 2022-03-30
Payer: MEDICARE

## 2022-03-30 VITALS
WEIGHT: 222.4 LBS | BODY MASS INDEX: 35.74 KG/M2 | HEART RATE: 75 BPM | RESPIRATION RATE: 16 BRPM | DIASTOLIC BLOOD PRESSURE: 82 MMHG | OXYGEN SATURATION: 95 % | HEIGHT: 66 IN | TEMPERATURE: 97.8 F | SYSTOLIC BLOOD PRESSURE: 121 MMHG

## 2022-03-30 DIAGNOSIS — E11.65 TYPE 2 DIABETES MELLITUS WITH HYPERGLYCEMIA, WITHOUT LONG-TERM CURRENT USE OF INSULIN (HCC): Primary | ICD-10-CM

## 2022-03-30 DIAGNOSIS — E66.01 CLASS 2 SEVERE OBESITY WITH SERIOUS COMORBIDITY AND BODY MASS INDEX (BMI) OF 37.0 TO 37.9 IN ADULT, UNSPECIFIED OBESITY TYPE (HCC): ICD-10-CM

## 2022-03-30 LAB
ANION GAP SERPL CALC-SCNC: 3 MMOL/L (ref 5–15)
BUN SERPL-MCNC: 10 MG/DL (ref 6–20)
BUN/CREAT SERPL: 14 (ref 12–20)
CALCIUM SERPL-MCNC: 9.7 MG/DL (ref 8.5–10.1)
CHLORIDE SERPL-SCNC: 103 MMOL/L (ref 97–108)
CO2 SERPL-SCNC: 31 MMOL/L (ref 21–32)
CREAT SERPL-MCNC: 0.72 MG/DL (ref 0.55–1.02)
EST. AVERAGE GLUCOSE BLD GHB EST-MCNC: 223 MG/DL
GLUCOSE SERPL-MCNC: 115 MG/DL (ref 65–100)
HBA1C MFR BLD: 9.4 % (ref 4–5.6)
POTASSIUM SERPL-SCNC: 4.2 MMOL/L (ref 3.5–5.1)
SODIUM SERPL-SCNC: 137 MMOL/L (ref 136–145)

## 2022-03-30 PROCEDURE — 3046F HEMOGLOBIN A1C LEVEL >9.0%: CPT | Performed by: INTERNAL MEDICINE

## 2022-03-30 PROCEDURE — 1101F PT FALLS ASSESS-DOCD LE1/YR: CPT | Performed by: INTERNAL MEDICINE

## 2022-03-30 PROCEDURE — G8427 DOCREV CUR MEDS BY ELIG CLIN: HCPCS | Performed by: INTERNAL MEDICINE

## 2022-03-30 PROCEDURE — 2022F DILAT RTA XM EVC RTNOPTHY: CPT | Performed by: INTERNAL MEDICINE

## 2022-03-30 PROCEDURE — 3017F COLORECTAL CA SCREEN DOC REV: CPT | Performed by: INTERNAL MEDICINE

## 2022-03-30 PROCEDURE — 1090F PRES/ABSN URINE INCON ASSESS: CPT | Performed by: INTERNAL MEDICINE

## 2022-03-30 PROCEDURE — 99214 OFFICE O/P EST MOD 30 MIN: CPT | Performed by: INTERNAL MEDICINE

## 2022-03-30 PROCEDURE — G8400 PT W/DXA NO RESULTS DOC: HCPCS | Performed by: INTERNAL MEDICINE

## 2022-03-30 PROCEDURE — G8417 CALC BMI ABV UP PARAM F/U: HCPCS | Performed by: INTERNAL MEDICINE

## 2022-03-30 PROCEDURE — G8510 SCR DEP NEG, NO PLAN REQD: HCPCS | Performed by: INTERNAL MEDICINE

## 2022-03-30 PROCEDURE — G8536 NO DOC ELDER MAL SCRN: HCPCS | Performed by: INTERNAL MEDICINE

## 2022-03-30 PROCEDURE — G9899 SCRN MAM PERF RSLTS DOC: HCPCS | Performed by: INTERNAL MEDICINE

## 2022-03-30 RX ORDER — METFORMIN HYDROCHLORIDE 500 MG/1
500 TABLET ORAL
Qty: 90 TABLET | Refills: 1 | Status: SHIPPED | OUTPATIENT
Start: 2022-03-30 | End: 2022-05-31

## 2022-03-30 RX ORDER — GLIPIZIDE 5 MG/1
TABLET ORAL
Qty: 30 TABLET | Refills: 2 | Status: SHIPPED | OUTPATIENT
Start: 2022-03-30 | End: 2022-05-31 | Stop reason: DRUGHIGH

## 2022-03-30 NOTE — PATIENT INSTRUCTIONS
Learning About Meal Planning for Diabetes  Why plan your meals? Meal planning can be a key part of managing diabetes. Planning meals and snacks with the right balance of carbohydrate, protein, and fat can help you keep your blood sugar at the target level you set with your doctor. You don't have to eat special foods. You can eat what your family eats, including sweets once in a while. But you do have to pay attention to how often you eat and how much you eat of certain foods. You may want to work with a dietitian or a diabetes educator. They can give you tips and meal ideas and can answer your questions about meal planning. This health professional can also help you reach a healthy weight if that is one of your goals. What plan is right for you? Your dietitian or diabetes educator may suggest that you start with the plate format or carbohydrate counting. The plate format  The plate format is a simple way to help you manage how you eat. You plan meals by learning how much space each food should take on a plate. Using the plate format helps you manage the amount of carbohydrate you eat. It can make it easier to keep your blood sugar level within your target range. It also helps you see if you're eating healthy portion sizes. To use the plate format, you put non-starchy vegetables on half your plate. Add lean protein foods, such as fish, lean meats and poultry, or soy products, on one-quarter of the plate. Put a grain or starchy vegetable (such as brown rice or a potato) on the final quarter of the plate. You can add a small piece of fruit and some low-fat or fat-free milk or yogurt, depending on your carbohydrate goal for each meal.  Here are some tips for using the plate format:  · Make sure that you are not using an oversized plate. A 9-inch plate is best. Many restaurants use larger plates. · Get used to using the plate format at home. Then you can use it when you eat out.   · Write down your questions about using the plate format. Talk to your doctor, a dietitian, or a diabetes educator about your concerns. Carbohydrate counting  With carbohydrate counting, you plan meals based on the amount of carbohydrate in each food. Carbohydrate raises blood sugar higher and more quickly than any other nutrient. It is found in desserts, breads and cereals, and fruit. It's also found in starchy vegetables such as potatoes and corn, grains such as rice and pasta, and milk and yogurt. You can help keep your blood sugar levels within your target range by planning how much carbohydrate to have at meals and snacks. The amount you need depends on several things. These include your weight, how active you are, which diabetes medicines you take, and what your goals are for your blood sugar levels. A registered dietitian or diabetes educator can help you plan how much carbohydrate to include in each meal and snack. An example of a carbohydrate counting plan is:  · 45 to 60 grams at each meal. That's about the same as 3 to 4 carbohydrate servings. · 15 to 20 grams at each snack. That's about the same as 1 carbohydrate serving. The Nutrition Facts label on packaged foods tells you how much carbohydrate is in a serving of the food. First, look at the serving size on the food label. Is that the amount you eat in a serving? All of the nutrition information on a food label is based on that serving size. So if you eat more or less than that, you'll need to adjust the other numbers. Total carbohydrate is the next thing you need to look for on the label. If you count carbohydrate servings, one serving of carbohydrate is 15 grams. For foods that don't come with labels, such as fresh fruits and vegetables, you'll need a guide that lists carbohydrate in these foods. Ask your doctor, dietitian, or diabetes educator about books or other nutrition guides you can use.   If you take insulin, you need to know how many grams of carbohydrate are in a meal. This lets you know how much rapid-acting insulin to take before you eat. If you use an insulin pump, you get a constant rate of insulin during the day. So the pump must be programmed at meals to give you extra insulin to cover the rise in blood sugar after meals. When you know how much carbohydrate you will eat, you can take the right amount of insulin. Or, if you always use the same amount of insulin, you need to make sure that you eat the same amount of carbohydrate at meals. If you need more help to understand carbohydrate counting and food labels, ask your doctor, dietitian, or diabetes educator. How can you plan healthy meals? Here are some tips to get started:  · Plan your meals a week at a time. Don't forget to include snacks too. · Use cookbooks or online recipes to plan several main meals. Plan some quick meals for busy nights. You also can double some recipes that freeze well. Then you can save half for other busy nights when you don't have time to cook. · Make sure you have the ingredients you need for your recipes. If you're running low on basic items, put these items on your shopping list too. · List foods that you use to make breakfasts, lunches, and snacks. List plenty of fruits and vegetables. · Post this list on the refrigerator. Add to it as you think of more things you need. · Take the list to the store to do your weekly shopping. Follow-up care is a key part of your treatment and safety. Be sure to make and go to all appointments, and call your doctor if you are having problems. It's also a good idea to know your test results and keep a list of the medicines you take. Where can you learn more? Go to http://www.gray.com/  Enter P676 in the search box to learn more about \"Learning About Meal Planning for Diabetes. \"  Current as of: September 8, 2021               Content Version: 13.2  © 0718-0644 Healthwise, [facility].    Care instructions adapted under license by [employer] (which disclaims liability or warranty for this information). If you have questions about a medical condition or this instruction, always ask your healthcare professional. [address] any warranty or liability for your use of this information.

## 2022-03-30 NOTE — PROGRESS NOTES
RM 5    Chief Complaint   Patient presents with    Diabetes     follow-up       Visit Vitals  /82 (BP 1 Location: Left upper arm, BP Patient Position: Sitting, BP Cuff Size: Large adult)   Pulse 75   Temp 97.8 °F (36.6 °C) (Oral)   Resp 16   Ht 5' 5.5\" (1.664 m)   Wt 222 lb 6.4 oz (100.9 kg)   SpO2 95%   BMI 36.45 kg/m²       3 most recent PHQ Screens 3/30/2022   Little interest or pleasure in doing things Not at all   Feeling down, depressed, irritable, or hopeless Not at all   Total Score PHQ 2 0         1. Have you been to the ER, urgent care clinic since your last visit? Hospitalized since your last visit? No    2. Have you seen or consulted any other health care providers outside of the [address] [place] [address] since your last visit? Include any pap smears or colon screening. No    Health Maintenance Due   Topic Date Due    Eye Exam Retinal or Dilated  Never done    DTaP/Tdap/Td series (1 - Tdap) Never done    Colorectal Cancer Screening Combo  Never done    Shingrix Vaccine Age 50> (1 of 2) Never done    Bone Densitometry (Dexa) Screening  Never done    COVID-19 Vaccine (2 - Moderna 3-dose series) 11/05/2021       Learning Assessment 10/19/2021   PRIMARY LEARNER Patient   HIGHEST LEVEL OF EDUCATION - PRIMARY LEARNER  GRADUATED HIGH SCHOOL OR GED   BARRIERS PRIMARY LEARNER NONE   CO-LEARNER CAREGIVER Yes   PRIMARY LANGUAGE ENGLISH   LEARNER PREFERENCE PRIMARY READING   ANSWERED BY patient   RELATIONSHIP SELF       AVS  education, follow up, and recommendations provided and addressed with patient.  services used to advise patient. NO

## 2022-03-30 NOTE — PROGRESS NOTES
A/P:  Lv Brewer is a 78 y.o. female, she presents today for:    1. Type 2 diabetes mellitus with hyperglycemia, without long-term current use of insulin (HCC)  -     metFORMIN (GLUCOPHAGE) 500 mg tablet; Take 1 Tablet by mouth daily (with breakfast). , Normal, Disp-90 Tablet, R-1  -     HEMOGLOBIN A1C WITH EAG; Future  -     METABOLIC PANEL, BASIC; Future  2. Class 2 severe obesity with serious comorbidity and body mass index (BMI) of 37.0 to 37.9 in adult, unspecified obesity type (HCC)  -     glipiZIDE (GLUCOTROL) 5 mg tablet; Take 1 tablet by mouth once daily with breakfast, Normal, Disp-30 Tablet, R-2    Uncontrolled Diabetes: managed with glipizide at this time. - had planned to start rybelsus - but unable to start due to cost   - will trial low dose metformin 500 mg   - reports that she had diarrhea with metformin does not want to try again.    - start rybelsus - semaglutide - notes cost will be under 50 dollars per month with is doable .   - recently saw eye doctor - advised to see specialist - has a referral   - not on statin due to statin myalgia. (reports she tried a couple of them). Follow-up and Dispositions    · Return in about 4 weeks (around 4/27/2022) for follow-up home blood sugars, virtual or in person. HPI     Did not start new medication - reports rybelsus, because initial deductable is 190$.     Measuring blood sugars - reports they are lower than before - yesterday morning was 170. Has not yet measured today. PMH/PSH: reviewed and updated  Sochx/Famhx: reviewed and updated     All: Allergies   Allergen Reactions    Penicillins Anaphylaxis and Hives    Pravastatin Myalgia     Med:   Current Outpatient Medications   Medication Sig    glipiZIDE (GLUCOTROL) 5 mg tablet Take 1 tablet by mouth once daily with breakfast    multivitamin (ONE A DAY) tablet Take 1 Tablet by mouth daily.  garlic (GARLIQUE PO) Take  by mouth.     levocetirizine (Xyzal) 5 mg tablet Xyzal 5 mg tablet   Take 1 tablet every day by oral route.  acetaminophen (TylenoL) 325 mg tablet Take  by mouth every four (4) hours as needed for Pain.  semaglutide (Rybelsus) 3 mg tablet Take 1 Tablet by mouth Daily (before breakfast). (Patient not taking: Reported on 3/30/2022)    semaglutide (Rybelsus) 7 mg tablet Take 1 Tablet by mouth Daily (before breakfast). (Patient not taking: Reported on 3/30/2022)     No current facility-administered medications for this visit. ROS pertinent for the following:  Review of Systems   Constitutional: Negative for chills, fever and malaise/fatigue. Respiratory: Negative for shortness of breath. Cardiovascular: Negative for chest pain. PE:  Blood pressure 121/82, pulse 75, temperature 97.8 °F (36.6 °C), temperature source Oral, resp. rate 16, height 5' 5.5\" (1.664 m), weight 222 lb 6.4 oz (100.9 kg), SpO2 95 %. Body mass index is 36.45 kg/m². Physical Exam  Vitals and nursing note reviewed. Constitutional:       General: She is not in acute distress. HENT:      Head: Normocephalic. Eyes:      Conjunctiva/sclera: Conjunctivae normal.      Pupils: Pupils are equal, round, and reactive to light. Cardiovascular:      Rate and Rhythm: Normal rate. Pulmonary:      Effort: Pulmonary effort is normal.   Musculoskeletal:      Cervical back: Neck supple. Skin:     Findings: No lesion. Neurological:      Mental Status: She is alert and oriented to person, place, and time. Labs:   See addendum for interpretation of labs resulting after time of visit. She was given AVS and expressed understanding with the diagnosis and plan as discussed. An electronic signature was used to authenticate this note.   -- Farheen Beckwith MD

## 2022-04-01 NOTE — PROGRESS NOTES
A1C similar to 1/2022 - patient had not started new medication. Continue with plan to increase glipizide and improve diet. Kidney function stable at this time.

## 2022-04-27 ENCOUNTER — VIRTUAL VISIT (OUTPATIENT)
Dept: INTERNAL MEDICINE CLINIC | Age: 67
End: 2022-04-27
Payer: MEDICARE

## 2022-04-27 DIAGNOSIS — M19.012 ARTHRITIS OF LEFT SHOULDER REGION: ICD-10-CM

## 2022-04-27 DIAGNOSIS — M17.0 PRIMARY OSTEOARTHRITIS OF BOTH KNEES: ICD-10-CM

## 2022-04-27 DIAGNOSIS — E11.65 TYPE 2 DIABETES MELLITUS WITH HYPERGLYCEMIA, WITHOUT LONG-TERM CURRENT USE OF INSULIN (HCC): Primary | ICD-10-CM

## 2022-04-27 PROCEDURE — G8427 DOCREV CUR MEDS BY ELIG CLIN: HCPCS | Performed by: INTERNAL MEDICINE

## 2022-04-27 PROCEDURE — 99214 OFFICE O/P EST MOD 30 MIN: CPT | Performed by: INTERNAL MEDICINE

## 2022-04-27 PROCEDURE — 1101F PT FALLS ASSESS-DOCD LE1/YR: CPT | Performed by: INTERNAL MEDICINE

## 2022-04-27 PROCEDURE — 3046F HEMOGLOBIN A1C LEVEL >9.0%: CPT | Performed by: INTERNAL MEDICINE

## 2022-04-27 PROCEDURE — 1090F PRES/ABSN URINE INCON ASSESS: CPT | Performed by: INTERNAL MEDICINE

## 2022-04-27 PROCEDURE — G9899 SCRN MAM PERF RSLTS DOC: HCPCS | Performed by: INTERNAL MEDICINE

## 2022-04-27 PROCEDURE — G8432 DEP SCR NOT DOC, RNG: HCPCS | Performed by: INTERNAL MEDICINE

## 2022-04-27 PROCEDURE — G8400 PT W/DXA NO RESULTS DOC: HCPCS | Performed by: INTERNAL MEDICINE

## 2022-04-27 PROCEDURE — 2022F DILAT RTA XM EVC RTNOPTHY: CPT | Performed by: INTERNAL MEDICINE

## 2022-04-27 PROCEDURE — 3017F COLORECTAL CA SCREEN DOC REV: CPT | Performed by: INTERNAL MEDICINE

## 2022-04-27 RX ORDER — BLOOD SUGAR DIAGNOSTIC
STRIP MISCELLANEOUS
Qty: 300 STRIP | Refills: 3 | Status: SHIPPED | OUTPATIENT
Start: 2022-04-27 | End: 2022-09-08 | Stop reason: SDUPTHER

## 2022-04-27 NOTE — PROGRESS NOTES
Leon Delgadillo is a 78 y.o. female who was seen by synchronous (real-time) audio-video technology on 4/27/2022. Consent: Leon Delgadillo, who was seen by synchronous (real-time) audio-video technology, and/or her healthcare decision maker, is aware that this patient-initiated, Telehealth encounter on 4/27/2022 is a billable service, with coverage as determined by her insurance carrier. She is aware that she may receive a bill and has provided verbal consent to proceed: Yes. Assessment & Plan:   Diagnoses and all orders for this visit:    1. Type 2 diabetes mellitus with hyperglycemia, without long-term current use of insulin ([facility])  -     glucose blood VI test strips (OneTouch Verio test strips) strip; Use to measure blood sugar on average 3 times per day. -     empagliflozin (Jardiance) 10 mg tablet; Take 1 Tablet by mouth daily. 2. Arthritis of left shoulder region  -     REFERRAL TO PHYSICAL THERAPY    3. Primary osteoarthritis of both knees  -     REFERRAL TO PHYSICAL THERAPY        Uncontrolled Diabetes: managed with glipizide at this time.    - restareted metformin at just 500 mg 3/30/2022   - unable to start rybelsus due to cost   - not on statin due to statin myalgia. - today discussed and ordered jardiance - will see if covered by insurance. - submitted for increased count of test strips. patient with uncontrolled diabetes    - follow-up in 1 month. Arthritis: inhibiting movement and exercise - to trial physical therapy - provided information    Scheduled follow-up for virtual in 1 month. (Please also see details in patient instructions)  Subjective:   Leon Delgadillo is a 78 y.o. female who was seen for No chief complaint on file. Taking metformin 1 tablet at night  First day had diarrhea. 119 blood sugar this morning. Nothing under 100. Nothing over 200. \"The highest\" was 157.    Lab Results   Component Value Date/Time    Hemoglobin A1c 9.4 (H) 03/30/2022 11:56 AM Hemoglobin A1c (POC) 9.7 (A) 01/27/2022 03:16 PM         Prior to Admission medications    Medication Sig Start Date End Date Taking? Authorizing Provider   metFORMIN (GLUCOPHAGE) 500 mg tablet Take 1 Tablet by mouth daily (with breakfast). 3/30/22   Rosario Isaac MD   glipiZIDE (GLUCOTROL) 5 mg tablet Take 1 tablet by mouth once daily with breakfast 3/30/22   Rosario Isaac MD   multivitamin (ONE A DAY) tablet Take 1 Tablet by mouth daily. Provider, Historical   garlic (GARLIQUE PO) Take  by mouth. Provider, Historical   semaglutide (Rybelsus) 3 mg tablet Take 1 Tablet by mouth Daily (before breakfast). Patient not taking: Reported on 3/30/2022 1/27/22   Rosario Isaac MD   semaglutide (Rybelsus) 7 mg tablet Take 1 Tablet by mouth Daily (before breakfast). Patient not taking: Reported on 3/30/2022 2/24/22   Rosario Isaac MD   levocetirizine (Xyzal) 5 mg tablet Xyzal 5 mg tablet   Take 1 tablet every day by oral route. Provider, Historical   acetaminophen (TylenoL) 325 mg tablet Take  by mouth every four (4) hours as needed for Pain. Provider, Historical     Allergies   Allergen Reactions    Penicillins Anaphylaxis and Hives    Pravastatin Myalgia     ROS    Objective:   Vital Signs: (As obtained by patient/caregiver at home)  There were no vitals taken for this visit.      PHYSICAL EXAMINATION:    Constitutional: [x] Appears well-developed and well-nourished [x] No apparent distress      Mental status: [x] Alert and awake  [x] Oriented to person/place/time [x] Able to follow commands      Eyes:   EOM    [x]  Normal      Sclera  [x]  Normal              Discharge [x]  None visible       HENT: [x] Normocephalic, atraumatic    [x] Mouth/Throat: Mucous membranes are moist      Pulmonary/Chest: [x] Respiratory effort normal   [x] No visualized signs of difficulty breathing or respiratory distress          Neurological:        [x] No Facial Asymmetry (Cranial nerve 7 motor function) (limited exam due to video visit)          [x] No gaze palsy              Skin:        [x] No significant exanthematous lesions or discoloration noted on facial skin              Psychiatric:       [x] Normal Affect        [x] Normal behavior        We discussed the expected course, resolution and complications of the diagnosis(es) in detail. Medication risks, benefits, costs, interactions, and alternatives were discussed as indicated. I advised her to contact the office if her condition worsens, changes or fails to improve as anticipated. She expressed understanding with the diagnosis(es) and plan. Carlota Chapman is a 78 y.o. female who was evaluated by a video visit encounter for concerns as above. Patient identification was verified prior to start of the visit. A caregiver was present when appropriate. Due to this being a TeleHealth encounter (During VLEBY-51 public health emergency), evaluation of the following organ systems was limited: Vitals/Constitutional/EENT/Resp/CV/GI//MS/Neuro/Skin/Heme-Lymph-Imm. Pursuant to the emergency declaration under the [facility], [facility]5 waiver authority and the [place] [employer] Act, this Virtual  Visit was conducted, with patient's (and/or legal guardian's) consent, to reduce the patient's risk of exposure to COVID-19 and provide necessary medical care. Services were provided through a video synchronous discussion virtually to substitute for in-person clinic visit. Patient was located at their home. Provider was in office.        Rachel Eng MD

## 2022-04-27 NOTE — PATIENT INSTRUCTIONS
Locations for Physical therapy    - referral order has been sent to the [address] [place] [address] team electronically. - If you choose a different location, Please take a copy of physicians referral order with you.     [facility] [place]    Physical Therapy At [address] [facility], [address]   - Phone: [phone]    [facility] Physical Therapy at [address] [facility] [address]   - Phone: [phone]    [address] [place] [address] Physical Therapy at [facility], [address], [address] [place] [address]   - Phone [phone]    Other Locations    [facility] therapy   - [address], [address] [facility]   - phone: [phone] [facility] Physical therapy and Sports Medicine   - [address], [address]   - Phone: [phone]    [facility]   - [address] [address]   - phone: [phone]    [facility]   - [address] [facility] [address], [address] [facility] [address]   - phone: [phone]

## 2022-05-31 ENCOUNTER — OFFICE VISIT (OUTPATIENT)
Dept: INTERNAL MEDICINE CLINIC | Age: 67
End: 2022-05-31
Payer: MEDICARE

## 2022-05-31 VITALS
HEART RATE: 74 BPM | WEIGHT: 222.8 LBS | RESPIRATION RATE: 16 BRPM | DIASTOLIC BLOOD PRESSURE: 80 MMHG | SYSTOLIC BLOOD PRESSURE: 116 MMHG | BODY MASS INDEX: 35.81 KG/M2 | OXYGEN SATURATION: 96 % | TEMPERATURE: 97.5 F | HEIGHT: 66 IN

## 2022-05-31 DIAGNOSIS — M19.012 PRIMARY OSTEOARTHRITIS OF LEFT SHOULDER: ICD-10-CM

## 2022-05-31 DIAGNOSIS — M17.0 PRIMARY OSTEOARTHRITIS OF BOTH KNEES: ICD-10-CM

## 2022-05-31 DIAGNOSIS — E11.65 TYPE 2 DIABETES MELLITUS WITH HYPERGLYCEMIA, WITHOUT LONG-TERM CURRENT USE OF INSULIN (HCC): Primary | ICD-10-CM

## 2022-05-31 PROCEDURE — 1101F PT FALLS ASSESS-DOCD LE1/YR: CPT | Performed by: INTERNAL MEDICINE

## 2022-05-31 PROCEDURE — 99214 OFFICE O/P EST MOD 30 MIN: CPT | Performed by: INTERNAL MEDICINE

## 2022-05-31 PROCEDURE — 2022F DILAT RTA XM EVC RTNOPTHY: CPT | Performed by: INTERNAL MEDICINE

## 2022-05-31 PROCEDURE — G8510 SCR DEP NEG, NO PLAN REQD: HCPCS | Performed by: INTERNAL MEDICINE

## 2022-05-31 PROCEDURE — G8400 PT W/DXA NO RESULTS DOC: HCPCS | Performed by: INTERNAL MEDICINE

## 2022-05-31 PROCEDURE — 1123F ACP DISCUSS/DSCN MKR DOCD: CPT | Performed by: INTERNAL MEDICINE

## 2022-05-31 PROCEDURE — G8417 CALC BMI ABV UP PARAM F/U: HCPCS | Performed by: INTERNAL MEDICINE

## 2022-05-31 PROCEDURE — 3046F HEMOGLOBIN A1C LEVEL >9.0%: CPT | Performed by: INTERNAL MEDICINE

## 2022-05-31 PROCEDURE — G9899 SCRN MAM PERF RSLTS DOC: HCPCS | Performed by: INTERNAL MEDICINE

## 2022-05-31 PROCEDURE — 1090F PRES/ABSN URINE INCON ASSESS: CPT | Performed by: INTERNAL MEDICINE

## 2022-05-31 PROCEDURE — G8536 NO DOC ELDER MAL SCRN: HCPCS | Performed by: INTERNAL MEDICINE

## 2022-05-31 PROCEDURE — G8427 DOCREV CUR MEDS BY ELIG CLIN: HCPCS | Performed by: INTERNAL MEDICINE

## 2022-05-31 PROCEDURE — 3017F COLORECTAL CA SCREEN DOC REV: CPT | Performed by: INTERNAL MEDICINE

## 2022-05-31 RX ORDER — GLIPIZIDE 10 MG/1
10 TABLET, FILM COATED, EXTENDED RELEASE ORAL DAILY
Qty: 30 TABLET | Refills: 1 | Status: SHIPPED | OUTPATIENT
Start: 2022-05-31 | End: 2022-08-10

## 2022-05-31 NOTE — PATIENT INSTRUCTIONS
Diabetes Blood Sugar Emergencies: Your Action Plan  How can you prevent a blood sugar emergency? An important part of living with diabetes is keeping your blood sugar in your target range. You'll need to know what to do if it's too high or too low. Managing your blood sugar levels helps you avoid emergencies. This care sheet will teach you about the signs of high and low blood sugar. It will help you make an action plan with your doctor for when these signs occur. Low blood sugar is more likely to happen if you take certain medicines for diabetes. It can also happen if you skip a meal, drink alcohol, or exercise more than usual.  You may get high blood sugar if you eat differently than you normally do. One example is eating more carbohydrate than usual. Having a cold, the flu, or other sudden illness can also cause high blood sugar levels. Levels can also rise if you miss a dose of medicine. Any change in how you take your medicine may affect your blood sugar level. So it's important to work with your doctor before you make any changes. Track your blood sugar  Work with your doctor to fill in the blank spaces below that apply to you. Track your levels, know your target range, and write down ways you can get your blood sugar back in your target range. A log book can help you track your levels. Take the book to all of your medical appointments. · Check your blood sugar _____ times a day, at these times:________________________________________________. (For example: Before meals, at bedtime, before exercise, during exercise, other.)  · Your blood sugar target range before a meal is ___________________. Your blood sugar target range after a meal is _______________________. · Do this--___________________________________________________--to get your blood sugar back within your safe range if your blood sugar results are _________________________________________.  (For example: Less than 70 or above 250 mg/dL.)  Call your doctor when your blood sugar results are ___________________________________. (For example: Less than 70 or above 250 mg/dL.)  What are the symptoms of low and high blood sugar? Common symptoms of low blood sugar are sweating and feeling shaky, weak, hungry, or confused. Symptoms can start quickly. Common symptoms of high blood sugar are feeling very thirsty or very hungry. You may also pass urine more often than usual. You may have blurry vision and may lose weight without trying. But some people may have high or low blood sugar without having any symptoms. That's a good reason to check your blood sugar on a regular schedule. What should you do if you have symptoms? Work with your doctor to fill in the blank spaces below that apply to you. Low blood sugar and \"the rule of 15\"  If you have symptoms of low blood sugar, check your blood sugar. If it's below _____ ( for example, below 70), eat or drink a quick-sugar food that has about 15 grams of carbohydrate. Your goal is to get your level back to your safe range. Check your blood sugar again 15 minutes later. If it's still not in your target range, take another 15 grams of carbohydrate and check your blood sugar again in 15 minutes. Repeat this until you reach your target. Then go back to your regular testing schedule. Children usually need less than 15 grams of carbohydrate. Check with your doctor or diabetes educator for the amount that is right for your child. When you have low blood sugar, it's best to stop or reduce any physical activity until your blood sugar is back in your target range and is stable. If you must stay active, eat or drink 30 grams of carbohydrate. Then check your blood sugar again in 15 minutes. If it's not in your target range, take another 30 grams of carbohydrates. Check your blood sugar again in 15 minutes. Keep doing this until you reach your target. You can then go back to your regular testing schedule.   If your symptoms or blood sugar levels are getting worse or have not improved after 15 minutes, seek medical care right away. If you take insulin, always carry a glucagon emergency kit. Be sure your family, friends, and coworkers know how to give glucagon. Here are some examples of quick-sugar foods with 15 grams of carbohydrate:  · 3 or 4 glucose tablets  · 1 tablespoon (3 teaspoons) table sugar  · ½ cup to ¾ cup (4 to 6 ounces) of fruit juice or regular (not diet) soda  · Hard candy (such as 6 Life Savers)  High blood sugar  If you have symptoms of high blood sugar, check your blood sugar. Your goal is to get your level back to your target range. If it's above ______ ( for example, above 250), follow these steps:  · If you missed a dose of your diabetes medicine, take it now. Take only the amount of medicine that you have been prescribed. Do not take more or less medicine. · Give yourself insulin if your doctor has prescribed it for high blood sugar. · Test for ketones, if the doctor told you to do so. If the results of the ketone test show a moderate-to-large amount of ketones, call the doctor for advice. · Wait 30 minutes after you take the extra insulin or the missed medicine. Check your blood sugar again. If your symptoms or blood sugar levels are getting worse or have not improved after taking these steps, seek medical care right away. Follow-up care is a key part of your treatment and safety. Be sure to make and go to all appointments, and call your doctor if you are having problems. It's also a good idea to know your test results and keep a list of the medicines you take. Where can you learn more? Go to http://www.gray.com/  Enter I535 in the search box to learn more about \"Diabetes Blood Sugar Emergencies: Your Action Plan. \"  Current as of: July 28, 2021               Content Version: 13.2  © 9706-3297 Healthwise, Incorporated.    Care instructions adapted under license by [employer] (which disclaims liability or warranty for this information). If you have questions about a medical condition or this instruction, always ask your healthcare professional. [address] any warranty or liability for your use of this information.

## 2022-05-31 NOTE — PROGRESS NOTES
A/P:  Gabriella Quiles is a 78 y.o. female, she presents today for:    1. Type 2 diabetes mellitus with hyperglycemia, without long-term current use of insulin (HCC)  -     glipiZIDE SR (GLUCOTROL XL) 10 mg CR tablet; Take 1 Tablet by mouth daily. , Normal, Disp-30 Tablet, R-1  2. Primary osteoarthritis of both knees  -     REFERRAL TO ORTHOPEDICS  -     REFERRAL TO PHYSICAL THERAPY  3. Primary osteoarthritis of left shoulder  -     REFERRAL TO ORTHOPEDICS  -     REFERRAL TO PHYSICAL THERAPY    Uncontrolled Diabetes: managed with glipizide at this time. - had planned to start rybelsus - but unable to start due to cost   - will trial low dose metformin 500 mg did not tolearte - diarrhea.    - start rybelsus planned but cost was too high. - declines all injectables. - sglt-2 ordered and PA completed - however copay remained to high. - titrate up glipizide to 10 SR.    - recently saw eye doctor - advised to see specialist - has a referral   - not on statin due to statin myalgia. (reports she tried a couple of them). Goal to increase activity - referral to ortho and PT to see if injections and or therapy can improve leg pain. Future Appointments   Date Time Provider Karen Denis   6/20/2022  8:30 AM Jaylen Anguiano MD [facility] AMB   8/1/2022  8:00 AM Nicolasa Babinski, MD [facility] BS AMB     HPI    Patient has high cost of jardiance - high cost for rybelsus. Is adverse to using injectable. Patient is walking the dog daily - around the building - not farther because of pain in her knees. PMH/PSH: reviewed and updated  Sochx/Famhx: reviewed and updated     All: Allergies   Allergen Reactions    Penicillins Anaphylaxis and Hives    Pravastatin Myalgia     Med:   Current Outpatient Medications   Medication Sig    glucose blood VI test strips (OneTouch Verio test strips) strip Use to measure blood sugar on average 3 times per day.     metFORMIN (GLUCOPHAGE) 500 mg tablet Take 1 Tablet by mouth daily (with breakfast).  glipiZIDE (GLUCOTROL) 5 mg tablet Take 1 tablet by mouth once daily with breakfast    multivitamin (ONE A DAY) tablet Take 1 Tablet by mouth daily.  garlic (GARLIQUE PO) Take  by mouth.  levocetirizine (Xyzal) 5 mg tablet Xyzal 5 mg tablet   Take 1 tablet every day by oral route.  acetaminophen (TylenoL) 325 mg tablet Take  by mouth every four (4) hours as needed for Pain.  empagliflozin (Jardiance) 10 mg tablet Take 1 Tablet by mouth daily. No current facility-administered medications for this visit. ROS pertinent for the following:  Review of Systems   Constitutional: Negative for chills, fever and malaise/fatigue. Respiratory: Negative for shortness of breath. Cardiovascular: Negative for chest pain. PE:  Blood pressure 116/80, pulse 74, temperature 97.5 °F (36.4 °C), temperature source Oral, resp. rate 16, height 5' 5.5\" (1.664 m), weight 222 lb 12.8 oz (101.1 kg), SpO2 96 %. Body mass index is 36.51 kg/m². Physical Exam  Vitals and nursing note reviewed. Constitutional:       General: She is not in acute distress. Appearance: Normal appearance. She is obese. HENT:      Head: Normocephalic and atraumatic. Eyes:      Extraocular Movements: Extraocular movements intact. Conjunctiva/sclera: Conjunctivae normal.      Pupils: Pupils are equal, round, and reactive to light. Cardiovascular:      Rate and Rhythm: Normal rate and regular rhythm. Heart sounds: Normal heart sounds. Pulmonary:      Effort: Pulmonary effort is normal.      Breath sounds: Normal breath sounds. Musculoskeletal:      Cervical back: Normal range of motion and neck supple. Skin:     General: Skin is warm and dry. Neurological:      Mental Status: She is alert and oriented to person, place, and time. Labs:   See addendum for interpretation of labs resulting after time of visit.      She was given AVS and expressed understanding with the diagnosis and plan as discussed. An electronic signature was used to authenticate this note.   -- Meredith Gay MD

## 2022-05-31 NOTE — PROGRESS NOTES
RM 1    Chief Complaint   Patient presents with    Diabetes     follow-up       Visit Vitals  /80 (BP 1 Location: Left upper arm, BP Patient Position: Sitting, BP Cuff Size: Adult)   Pulse 74   Temp 97.5 °F (36.4 °C) (Oral)   Resp 16   Ht 5' 5.5\" (1.664 m)   Wt 222 lb 12.8 oz (101.1 kg)   SpO2 96%   BMI 36.51 kg/m²       3 most recent PHQ Screens 5/31/2022   Little interest or pleasure in doing things Not at all   Feeling down, depressed, irritable, or hopeless Not at all   Total Score PHQ 2 0         1. Have you been to the ER, urgent care clinic since your last visit? Hospitalized since your last visit? No    2. Have you seen or consulted any other health care providers outside of the [address] [place] [address] since your last visit? Include any pap smears or colon screening. No    Health Maintenance Due   Topic Date Due    Eye Exam Retinal or Dilated  Never done    DTaP/Tdap/Td series (1 - Tdap) Never done    Colorectal Cancer Screening Combo  Never done    Shingrix Vaccine Age 50> (1 of 2) Never done    Bone Densitometry (Dexa) Screening  Never done    COVID-19 Vaccine (2 - Moderna 3-dose series) 11/05/2021       Learning Assessment 10/19/2021   PRIMARY LEARNER Patient   HIGHEST LEVEL OF EDUCATION - PRIMARY LEARNER  GRADUATED HIGH SCHOOL OR GED   BARRIERS PRIMARY LEARNER NONE   CO-LEARNER CAREGIVER Yes   PRIMARY LANGUAGE ENGLISH   LEARNER PREFERENCE PRIMARY READING   ANSWERED BY patient   RELATIONSHIP SELF         AVS  education, follow up, and recommendations provided and addressed with patient.  services used to advise patient. No

## 2022-06-20 ENCOUNTER — OFFICE VISIT (OUTPATIENT)
Dept: ORTHOPEDIC SURGERY | Age: 67
End: 2022-06-20
Payer: MEDICARE

## 2022-06-20 ENCOUNTER — TELEPHONE (OUTPATIENT)
Dept: INTERNAL MEDICINE CLINIC | Age: 67
End: 2022-06-20

## 2022-06-20 VITALS — BODY MASS INDEX: 36.65 KG/M2 | HEIGHT: 65 IN | WEIGHT: 220 LBS

## 2022-06-20 DIAGNOSIS — E11.65 TYPE 2 DIABETES MELLITUS WITH HYPERGLYCEMIA, WITH LONG-TERM CURRENT USE OF INSULIN (HCC): ICD-10-CM

## 2022-06-20 DIAGNOSIS — M25.512 ACUTE PAIN OF LEFT SHOULDER: Primary | ICD-10-CM

## 2022-06-20 DIAGNOSIS — E11.9 CONTROLLED TYPE 2 DIABETES MELLITUS WITHOUT COMPLICATION, WITH LONG-TERM CURRENT USE OF INSULIN (HCC): ICD-10-CM

## 2022-06-20 DIAGNOSIS — Z79.4 TYPE 2 DIABETES MELLITUS WITH HYPERGLYCEMIA, WITH LONG-TERM CURRENT USE OF INSULIN (HCC): ICD-10-CM

## 2022-06-20 DIAGNOSIS — Z79.4 CONTROLLED TYPE 2 DIABETES MELLITUS WITHOUT COMPLICATION, WITH LONG-TERM CURRENT USE OF INSULIN (HCC): ICD-10-CM

## 2022-06-20 DIAGNOSIS — M75.02 ADHESIVE CAPSULITIS OF LEFT SHOULDER: ICD-10-CM

## 2022-06-20 PROCEDURE — 2022F DILAT RTA XM EVC RTNOPTHY: CPT | Performed by: ORTHOPAEDIC SURGERY

## 2022-06-20 PROCEDURE — G8536 NO DOC ELDER MAL SCRN: HCPCS | Performed by: ORTHOPAEDIC SURGERY

## 2022-06-20 PROCEDURE — 3046F HEMOGLOBIN A1C LEVEL >9.0%: CPT | Performed by: ORTHOPAEDIC SURGERY

## 2022-06-20 PROCEDURE — 1101F PT FALLS ASSESS-DOCD LE1/YR: CPT | Performed by: ORTHOPAEDIC SURGERY

## 2022-06-20 PROCEDURE — G8400 PT W/DXA NO RESULTS DOC: HCPCS | Performed by: ORTHOPAEDIC SURGERY

## 2022-06-20 PROCEDURE — 1123F ACP DISCUSS/DSCN MKR DOCD: CPT | Performed by: ORTHOPAEDIC SURGERY

## 2022-06-20 PROCEDURE — 99203 OFFICE O/P NEW LOW 30 MIN: CPT | Performed by: ORTHOPAEDIC SURGERY

## 2022-06-20 PROCEDURE — 3017F COLORECTAL CA SCREEN DOC REV: CPT | Performed by: ORTHOPAEDIC SURGERY

## 2022-06-20 PROCEDURE — G9899 SCRN MAM PERF RSLTS DOC: HCPCS | Performed by: ORTHOPAEDIC SURGERY

## 2022-06-20 PROCEDURE — G8427 DOCREV CUR MEDS BY ELIG CLIN: HCPCS | Performed by: ORTHOPAEDIC SURGERY

## 2022-06-20 PROCEDURE — G8417 CALC BMI ABV UP PARAM F/U: HCPCS | Performed by: ORTHOPAEDIC SURGERY

## 2022-06-20 PROCEDURE — G8432 DEP SCR NOT DOC, RNG: HCPCS | Performed by: ORTHOPAEDIC SURGERY

## 2022-06-20 PROCEDURE — 1090F PRES/ABSN URINE INCON ASSESS: CPT | Performed by: ORTHOPAEDIC SURGERY

## 2022-06-20 NOTE — TELEPHONE ENCOUNTER
I have attempted to contact Cait Reyes by phone with the following results: no answer. The number that is listed is wrong number and unable to fax results.  Fax number is not accurate      Orin Krishnamurthy LPN

## 2022-06-20 NOTE — Clinical Note
6/20/2022    Patient: Adriano Castaneda   YOB: 1955   Date of Visit: 6/20/2022     Leeanne Evans MD  [address] [place] [address] Drive,[address] [address]  V[address], Student  [address]  [facility] [address]  Via     Dear MD Andre Arana, Student,      Thank you for referring Ms. Adriano Castaneda to [facility] for evaluation. My notes for this consultation are attached. If you have questions, please do not hesitate to call me. I look forward to following your patient along with you.       Sincerely,    Renita Mcelroy MD

## 2022-06-20 NOTE — PROGRESS NOTES
Riaz Morrissey (: 1955) is a 78 y.o. female, patient, here for evaluation of the following chief complaint(s):  Shoulder Pain (left shoulder pain)       HPI:    Patient presents the office today with chief complaint of left shoulder pain. Patient reports atraumatic onset discomfort of left shoulder. She describes pain with movement of the shoulder. She notices pain on the outer aspect of the shoulder. She denies catching or popping. Patient has had no prior history of left shoulder pain. Patient does have nighttime pain. Allergies   Allergen Reactions    Penicillins Anaphylaxis and Hives    Pravastatin Myalgia       Current Outpatient Medications   Medication Sig    glipiZIDE SR (GLUCOTROL XL) 10 mg CR tablet Take 1 Tablet by mouth daily.  glucose blood VI test strips (OneTouch Verio test strips) strip Use to measure blood sugar on average 3 times per day.  multivitamin (ONE A DAY) tablet Take 1 Tablet by mouth daily.  garlic (GARLIQUE PO) Take  by mouth.  levocetirizine (Xyzal) 5 mg tablet Xyzal 5 mg tablet   Take 1 tablet every day by oral route.  acetaminophen (TylenoL) 325 mg tablet Take  by mouth every four (4) hours as needed for Pain. No current facility-administered medications for this visit.        Past Medical History:   Diagnosis Date    Diabetes ([address]     type 2         Past Surgical History:   Procedure Laterality Date    HX APPENDECTOMY      HX CHOLECYSTECTOMY      HX TONSILLECTOMY         Family History   Problem Relation Age of Onset    Diabetes Mother     Diabetes Father     Heart Disease Father     Heart Disease Paternal Uncle         Social History     Socioeconomic History    Marital status:      Spouse name: Not on file    Number of children: Not on file    Years of education: Not on file    Highest education level: Not on file   Occupational History    Not on file   Tobacco Use    Smoking status: Former Smoker     Packs/day: 0.50 Years: 30.00     Pack years: 15.00     Types: Cigarettes     Quit date: 2015     Years since quittin.4    Smokeless tobacco: Never Used   Vaping Use    Vaping Use: Never used   Substance and Sexual Activity    Alcohol use: Yes     Comment: drinks 4-5 drinks on the weekends     Drug use: Not Currently     Types: Cocaine     Comment: stopped using in      Sexual activity: Not Currently   Other Topics Concern    Not on file   Social History Narrative    Not on file     Social Determinants of Health     Financial Resource Strain:     Difficulty of Paying Living Expenses: Not on file   Food Insecurity:     Worried About Running Out of Food in the Last Year: Not on file    Carlene of Food in the Last Year: Not on file   Transportation Needs:     Lack of Transportation (Medical): Not on file    Lack of Transportation (Non-Medical): Not on file   Physical Activity:     Days of Exercise per Week: Not on file    Minutes of Exercise per Session: Not on file   Stress:     Feeling of Stress : Not on file   Social Connections:     Frequency of Communication with Friends and Family: Not on file    Frequency of Social Gatherings with Friends and Family: Not on file    Attends Mormonism Services: Not on file    Active Member of [address] [place] [address] or Organizations: Not on file    Attends Club or Organization Meetings: Not on file    Marital Status: Not on file   Intimate Partner Violence:     Fear of Current or Ex-Partner: Not on file    Emotionally Abused: Not on file    Physically Abused: Not on file    Sexually Abused: Not on file   Housing Stability:     Unable to Pay for Housing in the Last Year: Not on file    Number of [place] in the Last Year: Not on file    Unstable Housing in the Last Year: Not on file       Review of Systems   Musculoskeletal:        Left shoulder pain       Vitals:  Ht 5' 5\" (1.651 m)   Wt 220 lb (99.8 kg)   BMI 36.61 kg/m²    Body mass index is 36.61 kg/m².     Ortho Exam Patient is alert and oriented x3. Patient is in no acute distress. Patient ambulates with a nonantalgic gait. Left shoulder: Neurovascular and sensory intact. There is tenderness to palpation at the anterior and anterior lateral shoulder. There is decreased range of motion in all planes passive and active. Pain with impingement testing including Johnston exam. Rotator cuff strength was difficult to test secondary to pain and decreased range of motion. Mild tenderness palpation at the acromioclavicular joint is noted. Normal stability. No open wounds or lesions. No swelling or ecchymosis. Right shoulder:  No shoulder girdle atrophy . There is no soft tissue swelling, ecchymosis, abrasions or lacerations. Active range of motion is full with forward flexion, lateral abduction and external rotation. Internal rotation is to the upper lumbar level with a negative lift-off sign. Passive range of motion is full with a negative impingement sign and a negative Johnston sign. Rotator cuff strength with forward flexion, lateral abduction and external rotation is intact with 5/5 strength. There is no crepitation about the joint. Palpation of the [facility] joint does not reproduce discomfort, and there is no pain elicited with cross-body adduction. Strength of the extremity is 5/5 at biceps/triceps/wrist extension. DRT's are intact at +2/4 and  symmetrical.  Cervical range of motion is full with no pain to palpation along the paraspinal musculature medial border of the scapula. Spurling's sign is negative. XR Results (most recent):  Results from Appointment encounter on 06/20/22    XR SHOULDER LT AP/LAT MIN 2 V    Narrative  Three-view x-ray of the left shoulder reveals no evidence of fracture or dislocation. She does have some mild erosion noted along the greater tuberosity. ASSESSMENT/PLAN:    Patient presents the office today with signs and symptoms consistent with adhesive capsulitis.   Patient does have a history of diabetes. I would recommend physical therapy at this stage. I have also gone over home exercise program.  I discussed appropriate use of anti-inflammatory medicine. Patient is to return to the office in 4 weeks.         Elizabeth Pimentel MD

## 2022-06-20 NOTE — TELEPHONE ENCOUNTER
----- Message from Sai Strong sent at 6/15/2022  3:21 PM EDT -----  Subject: Message to Provider    QUESTIONS  Information for Provider? Miki Perez with [employer] requested A1C test results for   patient from [facility] 3/30/2022. Stated they received fax but fax did not   include requested information. They received 2 of 6 pages. Requesting   results be resent to [phone].   ---------------------------------------------------------------------------  --------------  CALL BACK INFO  What is the best way for the office to contact you? OK to leave message on   voicemail  Preferred Call Back Phone Number? [phone]  ---------------------------------------------------------------------------  --------------  SCRIPT ANSWERS  Relationship to Patient? Third Party  Third Party Type? Insurance? Representative Name?  Miki Perez

## 2022-08-06 DIAGNOSIS — E11.65 TYPE 2 DIABETES MELLITUS WITH HYPERGLYCEMIA, WITHOUT LONG-TERM CURRENT USE OF INSULIN (HCC): ICD-10-CM

## 2022-08-10 RX ORDER — GLIPIZIDE 10 MG/1
TABLET, FILM COATED, EXTENDED RELEASE ORAL
Qty: 30 TABLET | Refills: 0 | Status: SHIPPED | OUTPATIENT
Start: 2022-08-10 | End: 2022-09-08 | Stop reason: SDUPTHER

## 2022-09-08 ENCOUNTER — OFFICE VISIT (OUTPATIENT)
Dept: INTERNAL MEDICINE CLINIC | Age: 67
End: 2022-09-08
Payer: MEDICARE

## 2022-09-08 VITALS
HEART RATE: 75 BPM | BODY MASS INDEX: 36.25 KG/M2 | TEMPERATURE: 97.8 F | WEIGHT: 217.6 LBS | DIASTOLIC BLOOD PRESSURE: 75 MMHG | RESPIRATION RATE: 16 BRPM | OXYGEN SATURATION: 95 % | HEIGHT: 65 IN | SYSTOLIC BLOOD PRESSURE: 122 MMHG

## 2022-09-08 DIAGNOSIS — Z12.31 ENCOUNTER FOR SCREENING MAMMOGRAM FOR MALIGNANT NEOPLASM OF BREAST: ICD-10-CM

## 2022-09-08 DIAGNOSIS — E78.5 HYPERLIPIDEMIA, UNSPECIFIED HYPERLIPIDEMIA TYPE: ICD-10-CM

## 2022-09-08 DIAGNOSIS — E11.65 TYPE 2 DIABETES MELLITUS WITH HYPERGLYCEMIA, WITHOUT LONG-TERM CURRENT USE OF INSULIN (HCC): Primary | ICD-10-CM

## 2022-09-08 DIAGNOSIS — T46.6X5A MYALGIA DUE TO STATIN: ICD-10-CM

## 2022-09-08 DIAGNOSIS — E66.01 CLASS 2 SEVERE OBESITY WITH SERIOUS COMORBIDITY AND BODY MASS INDEX (BMI) OF 36.0 TO 36.9 IN ADULT, UNSPECIFIED OBESITY TYPE (HCC): ICD-10-CM

## 2022-09-08 DIAGNOSIS — M79.10 MYALGIA DUE TO STATIN: ICD-10-CM

## 2022-09-08 PROBLEM — I35.8 AORTIC VALVE SCLEROSIS: Status: ACTIVE | Noted: 2021-10-19

## 2022-09-08 PROBLEM — E66.812 CLASS 2 SEVERE OBESITY WITH SERIOUS COMORBIDITY AND BODY MASS INDEX (BMI) OF 36.0 TO 36.9 IN ADULT (HCC): Status: ACTIVE | Noted: 2021-10-19

## 2022-09-08 LAB — HBA1C MFR BLD HPLC: 6.8 %

## 2022-09-08 PROCEDURE — 1090F PRES/ABSN URINE INCON ASSESS: CPT | Performed by: INTERNAL MEDICINE

## 2022-09-08 PROCEDURE — 2022F DILAT RTA XM EVC RTNOPTHY: CPT | Performed by: INTERNAL MEDICINE

## 2022-09-08 PROCEDURE — G8510 SCR DEP NEG, NO PLAN REQD: HCPCS | Performed by: INTERNAL MEDICINE

## 2022-09-08 PROCEDURE — 83036 HEMOGLOBIN GLYCOSYLATED A1C: CPT | Performed by: INTERNAL MEDICINE

## 2022-09-08 PROCEDURE — 3017F COLORECTAL CA SCREEN DOC REV: CPT | Performed by: INTERNAL MEDICINE

## 2022-09-08 PROCEDURE — 1101F PT FALLS ASSESS-DOCD LE1/YR: CPT | Performed by: INTERNAL MEDICINE

## 2022-09-08 PROCEDURE — G8400 PT W/DXA NO RESULTS DOC: HCPCS | Performed by: INTERNAL MEDICINE

## 2022-09-08 PROCEDURE — G9899 SCRN MAM PERF RSLTS DOC: HCPCS | Performed by: INTERNAL MEDICINE

## 2022-09-08 PROCEDURE — 3044F HG A1C LEVEL LT 7.0%: CPT | Performed by: INTERNAL MEDICINE

## 2022-09-08 PROCEDURE — 1123F ACP DISCUSS/DSCN MKR DOCD: CPT | Performed by: INTERNAL MEDICINE

## 2022-09-08 PROCEDURE — G8427 DOCREV CUR MEDS BY ELIG CLIN: HCPCS | Performed by: INTERNAL MEDICINE

## 2022-09-08 PROCEDURE — G8417 CALC BMI ABV UP PARAM F/U: HCPCS | Performed by: INTERNAL MEDICINE

## 2022-09-08 PROCEDURE — 99213 OFFICE O/P EST LOW 20 MIN: CPT | Performed by: INTERNAL MEDICINE

## 2022-09-08 PROCEDURE — G8536 NO DOC ELDER MAL SCRN: HCPCS | Performed by: INTERNAL MEDICINE

## 2022-09-08 RX ORDER — BLOOD SUGAR DIAGNOSTIC
STRIP MISCELLANEOUS
Qty: 300 STRIP | Refills: 3 | Status: SHIPPED | OUTPATIENT
Start: 2022-09-08

## 2022-09-08 RX ORDER — GLIPIZIDE 10 MG/1
10 TABLET, FILM COATED, EXTENDED RELEASE ORAL DAILY
Qty: 90 TABLET | Refills: 4 | Status: SHIPPED | OUTPATIENT
Start: 2022-09-08

## 2022-09-08 NOTE — PROGRESS NOTES
A/P:  Caprice Rice is a 78 y.o. female, she presents today for:    1. Type 2 diabetes mellitus with hyperglycemia, without long-term current use of insulin ([facility])  -     AMB POC HEMOGLOBIN A1C  -     glipiZIDE SR (GLUCOTROL XL) 10 mg CR tablet; Take 1 Tablet by mouth daily. , Normal, Disp-90 Tablet, R-4Curtesy refill - please call to schedule.  -     glucose blood VI test strips (OneTouch Verio test strips) strip; Use to measure blood sugar on average 3 times per day., Normal, Disp-300 Strip, R-3  2. Encounter for screening mammogram for malignant neoplasm of breast  -     [facility] MAMMO BI SCREENING INCL CAD; Future  3. Myalgia due to statin  4. Class 2 severe obesity with serious comorbidity and body mass index (BMI) of 36.0 to 36.9 in adult, unspecified obesity type ([facility] [address]  5. Hyperlipidemia, unspecified hyperlipidemia type    Controlled Diabetes: managed with glipizide at this time. Failed:   - metformin did not tolearte - diarrhea. - Rybelsus planned but cost was too high. - declines all injectables. - sglt-2 ordered and PA completed - however copay remained to high in 2022    Today   - titrate up glipizide to 10 SR.      - not on statin due to statin myalgia. (reports she tried a couple of them). Follow-up and Dispositions    Return in about 3 months (around 12/8/2022) for establish with new provider. HPI    Reports her sugar has been in the 90s, not under 80. Has been walking     Fall on curb in apartment complex. Is working as a     PMH/PSH: reviewed and updated  Sochx/Famhx: reviewed and updated     All: Allergies   Allergen Reactions    Penicillins Anaphylaxis and Hives    Pravastatin Myalgia     Med:   Current Outpatient Medications   Medication Sig    glipiZIDE SR (GLUCOTROL XL) 10 mg CR tablet Take 1 tablet by mouth once daily    glucose blood VI test strips (OneTouch Verio test strips) strip Use to measure blood sugar on average 3 times per day.     multivitamin (ONE A DAY) tablet pt ambulated on room air spo2; highest spo2 read 88%, pt ambulating with assistance, states she walks with a cane at home. While walking pt denies chest pain or discomfort, difficulty breathing, headache, dizziness. MD Randolph witnessed event; upon return to hospital bed pt felt "out of breath"; oxygen administered via nc at 4L. MD Silver aware. Pt on CM with spo2 on 4L nc. Guard rails up, bed low and locked. Will reassess Take 1 Tablet by mouth daily. garlic (GARLIQUE PO) Take  by mouth.    levocetirizine (XYZAL) 5 mg tablet Xyzal 5 mg tablet   Take 1 tablet every day by oral route. acetaminophen (TYLENOL) 325 mg tablet Take  by mouth every four (4) hours as needed for Pain. No current facility-administered medications for this visit. ROS pertinent for the following:  Review of Systems   All other systems reviewed and are negative. PE:  Blood pressure 122/75, pulse 75, temperature 97.8 °F (36.6 °C), temperature source Oral, resp. rate 16, height 5' 5\" (1.651 m), weight 217 lb 9.6 oz (98.7 kg), SpO2 95 %. Body mass index is 36.21 kg/m². Physical Exam  Vitals and nursing note reviewed. Constitutional:       General: She is not in acute distress. Appearance: Normal appearance. She is obese. HENT:      Head: Normocephalic and atraumatic. Eyes:      Extraocular Movements: Extraocular movements intact. Conjunctiva/sclera: Conjunctivae normal.      Pupils: Pupils are equal, round, and reactive to light. Cardiovascular:      Rate and Rhythm: Normal rate and regular rhythm. Heart sounds: Normal heart sounds. Pulmonary:      Effort: Pulmonary effort is normal.      Breath sounds: Normal breath sounds. Musculoskeletal:      Cervical back: Normal range of motion and neck supple. Skin:     General: Skin is warm and dry. Neurological:      Mental Status: She is alert and oriented to person, place, and time. Labs:   See addendum for interpretation of labs resulting after time of visit. She was given AVS and expressed understanding with the diagnosis and plan as discussed. An electronic signature was used to authenticate this note.   -- Juan Ramon Shrestha MD

## 2022-09-08 NOTE — PATIENT INSTRUCTIONS
Vaccines for the blake   - influenza   - bivalent covid boosters    Shingles. - optional - covered with prescription plan.          See below locations of colleagues in the [employer] group that have availability for establishing care.     ------[place] and [facility]-----    [address], [address] [place] [address]  Tel: [phone] [facility] - ages 11 and older   - Francisco Cortes MD      [address] [facility], [address] [place] [address]  Tel: [phone]    -----[facility]-----    [address] Liseth Muñoz NP   - Nito Maier NP    [address] [facility]  [address] [facility]  [facility]  Tel: [phone] Internal Medicine (18 and older only)   - Lizet Han MD   - [facility] NP   - Shan Corea NP    [address], [address] [facility]. [address]., [address]  Tel: 250 [facility],Fourth Floor practice   - Jignesh Nichols MD   - Garcia Christensen MD - special interest in women's health    [address] [facility]  Tel: [phone]      -----Aniket Mcdonald------    [address] center on [address] [facility] MD Gisela - special interest in obesity medicine    [facility] [address] [address]  Tel [phone]    [address] center at [address] Kristofer Cates MD    [address] [facility] [address], [address] [facility]  [address] [address]  Tel [phone]

## 2022-09-08 NOTE — PROGRESS NOTES
RM 1    Chief Complaint   Patient presents with    Diabetes     Follow-up    Toe Injury     Recent fall and injured toe       Visit Vitals  /75 (BP 1 Location: Left upper arm, BP Patient Position: Sitting, BP Cuff Size: Adult)   Pulse 75   Temp 97.8 °F (36.6 °C) (Oral)   Resp 16   Ht 5' 5\" (1.651 m)   Wt 217 lb 9.6 oz (98.7 kg)   SpO2 95%   BMI 36.21 kg/m²       3 most recent PHQ Screens 9/8/2022   Little interest or pleasure in doing things Not at all   Feeling down, depressed, irritable, or hopeless Not at all   Total Score PHQ 2 0         1. Have you been to the ER, urgent care clinic since your last visit? Hospitalized since your last visit? No    2. Have you seen or consulted any other health care providers outside of the [address] [place] [address] since your last visit? Include any pap smears or colon screening. No    Health Maintenance Due   Topic Date Due    Eye Exam Retinal or Dilated  Never done    DTaP/Tdap/Td series (1 - Tdap) Never done    Colorectal Cancer Screening Combo  Never done    Shingrix Vaccine Age 50> (1 of 2) Never done    Bone Densitometry (Dexa) Screening  Never done    COVID-19 Vaccine (2 - Moderna series) 11/05/2021    A1C test (Diabetic or Prediabetic)  06/30/2022    Flu Vaccine (1) 09/01/2022       Learning Assessment 10/19/2021   PRIMARY LEARNER Patient   HIGHEST LEVEL OF EDUCATION - PRIMARY LEARNER  GRADUATED HIGH SCHOOL OR GED   BARRIERS PRIMARY LEARNER NONE   CO-LEARNER CAREGIVER Yes   PRIMARY LANGUAGE ENGLISH   LEARNER PREFERENCE PRIMARY READING   ANSWERED BY patient   RELATIONSHIP SELF         AVS  education, follow up, and recommendations provided and addressed with patient.  services used to advise patient . No

## 2022-10-25 ENCOUNTER — OFFICE VISIT (OUTPATIENT)
Dept: INTERNAL MEDICINE CLINIC | Age: 67
End: 2022-10-25
Payer: MEDICARE

## 2022-10-25 VITALS
RESPIRATION RATE: 18 BRPM | OXYGEN SATURATION: 97 % | HEART RATE: 88 BPM | TEMPERATURE: 97.9 F | SYSTOLIC BLOOD PRESSURE: 137 MMHG | HEIGHT: 65 IN | WEIGHT: 220 LBS | BODY MASS INDEX: 36.65 KG/M2 | DIASTOLIC BLOOD PRESSURE: 77 MMHG

## 2022-10-25 DIAGNOSIS — E11.65 TYPE 2 DIABETES MELLITUS WITH HYPERGLYCEMIA, WITHOUT LONG-TERM CURRENT USE OF INSULIN (HCC): Primary | ICD-10-CM

## 2022-10-25 DIAGNOSIS — E78.5 HYPERLIPIDEMIA, UNSPECIFIED HYPERLIPIDEMIA TYPE: ICD-10-CM

## 2022-10-25 DIAGNOSIS — E66.01 CLASS 2 SEVERE OBESITY WITH SERIOUS COMORBIDITY AND BODY MASS INDEX (BMI) OF 36.0 TO 36.9 IN ADULT, UNSPECIFIED OBESITY TYPE (HCC): ICD-10-CM

## 2022-10-25 DIAGNOSIS — Z23 ENCOUNTER FOR IMMUNIZATION: ICD-10-CM

## 2022-10-25 PROBLEM — R01.1 HEART MURMUR: Status: ACTIVE | Noted: 2018-10-04

## 2022-10-25 PROBLEM — K64.9 HEMORRHOIDS: Status: ACTIVE | Noted: 2018-10-04

## 2022-10-25 PROBLEM — M25.561 PAIN IN RIGHT KNEE: Status: ACTIVE | Noted: 2018-10-04

## 2022-10-25 PROBLEM — M15.9 PRIMARY OSTEOARTHRITIS INVOLVING MULTIPLE JOINTS: Status: ACTIVE | Noted: 2021-05-03

## 2022-10-25 PROBLEM — I48.91 ATRIAL FIBRILLATION (HCC): Status: ACTIVE | Noted: 2018-10-04

## 2022-10-25 PROBLEM — R03.0 ELEVATED BP WITHOUT DIAGNOSIS OF HYPERTENSION: Status: ACTIVE | Noted: 2021-05-03

## 2022-10-25 PROBLEM — M19.90 ARTHRITIS: Status: ACTIVE | Noted: 2018-10-04

## 2022-10-25 PROBLEM — J31.0 CHRONIC RHINITIS: Status: ACTIVE | Noted: 2019-11-11

## 2022-10-25 PROBLEM — M15.0 PRIMARY OSTEOARTHRITIS INVOLVING MULTIPLE JOINTS: Status: ACTIVE | Noted: 2021-05-03

## 2022-10-25 PROCEDURE — 99204 OFFICE O/P NEW MOD 45 MIN: CPT | Performed by: NURSE PRACTITIONER

## 2022-10-25 PROCEDURE — G8536 NO DOC ELDER MAL SCRN: HCPCS | Performed by: NURSE PRACTITIONER

## 2022-10-25 PROCEDURE — 1101F PT FALLS ASSESS-DOCD LE1/YR: CPT | Performed by: NURSE PRACTITIONER

## 2022-10-25 PROCEDURE — G9899 SCRN MAM PERF RSLTS DOC: HCPCS | Performed by: NURSE PRACTITIONER

## 2022-10-25 PROCEDURE — G8400 PT W/DXA NO RESULTS DOC: HCPCS | Performed by: NURSE PRACTITIONER

## 2022-10-25 PROCEDURE — G8427 DOCREV CUR MEDS BY ELIG CLIN: HCPCS | Performed by: NURSE PRACTITIONER

## 2022-10-25 PROCEDURE — G0008 ADMIN INFLUENZA VIRUS VAC: HCPCS | Performed by: NURSE PRACTITIONER

## 2022-10-25 PROCEDURE — 3044F HG A1C LEVEL LT 7.0%: CPT | Performed by: NURSE PRACTITIONER

## 2022-10-25 PROCEDURE — G8417 CALC BMI ABV UP PARAM F/U: HCPCS | Performed by: NURSE PRACTITIONER

## 2022-10-25 PROCEDURE — G8432 DEP SCR NOT DOC, RNG: HCPCS | Performed by: NURSE PRACTITIONER

## 2022-10-25 PROCEDURE — 3017F COLORECTAL CA SCREEN DOC REV: CPT | Performed by: NURSE PRACTITIONER

## 2022-10-25 PROCEDURE — 90694 VACC AIIV4 NO PRSRV 0.5ML IM: CPT | Performed by: NURSE PRACTITIONER

## 2022-10-25 PROCEDURE — 1090F PRES/ABSN URINE INCON ASSESS: CPT | Performed by: NURSE PRACTITIONER

## 2022-10-25 PROCEDURE — 2022F DILAT RTA XM EVC RTNOPTHY: CPT | Performed by: NURSE PRACTITIONER

## 2022-10-25 RX ORDER — LATANOPROST 50 UG/ML
SOLUTION/ DROPS OPHTHALMIC
COMMUNITY
Start: 2022-10-06

## 2022-10-25 NOTE — PROGRESS NOTES
FLUAD administered 10/25/2022 by Tan Wagner LPN with guardian's consent. Patient tolerated procedure well. No reactions noted.

## 2022-10-25 NOTE — PROGRESS NOTES
Chief Complaint   Patient presents with    New Patient          Vitals:    10/25/22 1432   BP: 137/77   Pulse: 88   Resp: 18   Temp: 97.9 °F (36.6 °C)   TempSrc: Temporal   SpO2: 97%   Weight: 220 lb (99.8 kg)   Height: 5' 5\" (1.651 m)   PainSc:   0 - No pain       Current Outpatient Medications on File Prior to Visit   Medication Sig Dispense Refill    latanoprost (XALATAN) 0.005 % ophthalmic solution INSTILL 1 DROP INTO EACH EYE AT BEDTIME      glipiZIDE SR (GLUCOTROL XL) 10 mg CR tablet Take 1 Tablet by mouth daily. 90 Tablet 4    glucose blood VI test strips (OneTouch Verio test strips) strip Use to measure blood sugar on average 3 times per day. 300 Strip 3    multivitamin (ONE A DAY) tablet Take 1 Tablet by mouth daily. garlic (GARLIQUE PO) Take  by mouth.      levocetirizine (XYZAL) 5 mg tablet Xyzal 5 mg tablet   Take 1 tablet every day by oral route. acetaminophen (TYLENOL) 325 mg tablet Take  by mouth every four (4) hours as needed for Pain. No current facility-administered medications on file prior to visit. Health Maintenance Due   Topic    Eye Exam Retinal or Dilated     DTaP/Tdap/Td series (1 - Tdap)    Colorectal Cancer Screening Combo     Shingrix Vaccine Age 50> (1 of 2)    Bone Densitometry (Dexa) Screening     COVID-19 Vaccine (2 - Moderna series)    Flu Vaccine (1)    Lipid Screen        1. \"Have you been to the ER, urgent care clinic since your last visit? Hospitalized since your last visit? \" No    2. \"Have you seen or consulted any other health care providers outside of the [address] [place] [address] since your last visit? \" No     3. For patients aged 39-70: Has the patient had a colonoscopy / FIT/ Cologuard? Yes - no Care Gap present      If the patient is female:    4. For patients aged 41-77: Has the patient had a mammogram within the past 2 years? Yes - no Care Gap present      5. For patients aged 21-65: Has the patient had a pap smear?  Yes - no Care Gap present

## 2022-10-25 NOTE — PROGRESS NOTES
Juan Miguel Sauer is a 78 y.o. female  Chief Complaint   Patient presents with    New Patient     Visit Vitals  /77 (BP 1 Location: Left upper arm, BP Patient Position: Sitting, BP Cuff Size: Adult)   Pulse 88   Temp 97.9 °F (36.6 °C) (Temporal)   Resp 18   Ht 5' 5\" (1.651 m)   Wt 220 lb (99.8 kg)   SpO2 97%   BMI 36.61 kg/m²      HPI    Patient here to establish care. PMH  Hyperlipidemia- Cannot take a statin. Has tried 3. \"Cant get out of bed\" Garlic . Diabetes-  did not tolerate metformin. Tolerating glypizide. Lab Results   Component Value Date/Time    Hemoglobin A1c 9.4 (H) 03/30/2022 11:56 AM    Hemoglobin A1c (POC) 6.8 09/08/2022 01:45 PM     Could not get approved for Rybelsus. check BS once a week 120. No neuropathy. AVS- released from cardiology. Had Afib. Needed another doctor     Glaucoma- mild/ taking drops as directed    Obesity-   Wt Readings from Last 3 Encounters:   10/25/22 220 lb (99.8 kg)   09/08/22 217 lb 9.6 oz (98.7 kg)   06/20/22 220 lb (99.8 kg)     Stable weight. Past Medical History:   Diagnosis Date    Diabetes ([address]     type 2      Past Surgical History:   Procedure Laterality Date    HX APPENDECTOMY      HX CHOLECYSTECTOMY      HX TONSILLECTOMY         ROS  Review of Systems   Constitutional:  Negative for chills, fever and malaise/fatigue. HENT:  Negative for hearing loss. Eyes:  Negative for blurred vision. Respiratory:  Negative for cough. Cardiovascular:  Negative for chest pain. Gastrointestinal:  Negative for abdominal pain, nausea and vomiting. Musculoskeletal:  Positive for joint pain. Negative for back pain, falls and myalgias. Neurological:  Negative for dizziness, weakness and headaches. Psychiatric/Behavioral:  Negative for depression. EXAM  Physical Exam  Vitals and nursing note reviewed. Constitutional:       General: She is not in acute distress. Appearance: Normal appearance. She is well-developed and normal weight. HENT:      Head: Normocephalic and atraumatic. Right Ear: Tympanic membrane normal.      Left Ear: Tympanic membrane normal.      Nose: Nose normal.      Mouth/Throat:      Mouth: Mucous membranes are moist.      Pharynx: No posterior oropharyngeal erythema. Eyes:      Extraocular Movements: Extraocular movements intact. Pupils: Pupils are equal, round, and reactive to light. Cardiovascular:      Rate and Rhythm: Normal rate and regular rhythm. Pulses: Normal pulses. Heart sounds: Normal heart sounds. Pulmonary:      Effort: Pulmonary effort is normal.      Breath sounds: Normal breath sounds. Abdominal:      Palpations: Abdomen is soft. Musculoskeletal:         General: Normal range of motion. Cervical back: Normal range of motion. No rigidity or tenderness. Feet:      Comments: Diabetic foot exam:     Left: Reflexes 1+     Vibratory sensation normal    Proprioception normal   Sharp/dull discrimination normal    Filament test normal sensation with micro filament   Pulse DP: 2+ (normal)   Pulse PT: 2+ (normal)   Deformities: None  Right: Reflexes 1+   Vibratory sensation normal   Proprioception normal   Sharp/dull discrimination normal   Filament test normal sensation with micro filament   Pulse DP: 2+ (normal)   Pulse PT: 2+ (normal)   Deformities: None    Lymphadenopathy:      Cervical: No cervical adenopathy. Skin:     General: Skin is warm and dry. Capillary Refill: Capillary refill takes less than 2 seconds. Neurological:      General: No focal deficit present. Mental Status: She is alert and oriented to person, place, and time. Psychiatric:         Mood and Affect: Mood normal.     ASSESSMENT/PLAN    Orders Placed This Encounter    latanoprost (XALATAN) 0.005 % ophthalmic solution       ICD-10-CM ICD-9-CM    1.  Type 2 diabetes mellitus with hyperglycemia, without long-term current use of insulin ([facility])  D52.36 405.48 METABOLIC PANEL, COMPREHENSIVE 790.29  DIABETES FOOT EXAM      2. Class 2 severe obesity with serious comorbidity and body mass index (BMI) of 36.0 to 36.9 in adult, unspecified obesity type ([facility] [address]  E66.01 278.01 CBC WITH AUTOMATED DIFF    Z68.36 V85.36       3.  Hyperlipidemia, unspecified hyperlipidemia type  E78.5 272.4 LIPID PANEL      4. Encounter for immunization  Z23 V03.89 INFLUENZA, FLUAD, (AGE 72 Y+), IM, PF, 0.5 ML      Return 3 months for follow up  Discussed dietary ways to reduce weight and decrease cholesterol    Signed By: ENRIQUE Mccall     October 25, 2022

## 2022-12-21 ENCOUNTER — HOSPITAL ENCOUNTER (OUTPATIENT)
Dept: MAMMOGRAPHY | Age: 67
Discharge: HOME | End: 2022-12-21
Attending: INTERNAL MEDICINE
Payer: MEDICARE

## 2022-12-21 DIAGNOSIS — Z12.31 ENCOUNTER FOR SCREENING MAMMOGRAM FOR MALIGNANT NEOPLASM OF BREAST: ICD-10-CM

## 2022-12-21 PROCEDURE — 77067 SCR MAMMO BI INCL CAD: CPT

## 2023-02-01 ENCOUNTER — OFFICE VISIT (OUTPATIENT)
Dept: INTERNAL MEDICINE CLINIC | Age: 68
End: 2023-02-01
Payer: MEDICARE

## 2023-02-01 VITALS
RESPIRATION RATE: 16 BRPM | WEIGHT: 225 LBS | TEMPERATURE: 98.6 F | HEART RATE: 68 BPM | DIASTOLIC BLOOD PRESSURE: 74 MMHG | HEIGHT: 65 IN | BODY MASS INDEX: 37.49 KG/M2 | OXYGEN SATURATION: 96 % | SYSTOLIC BLOOD PRESSURE: 126 MMHG

## 2023-02-01 DIAGNOSIS — E66.01 CLASS 2 SEVERE OBESITY WITH SERIOUS COMORBIDITY AND BODY MASS INDEX (BMI) OF 36.0 TO 36.9 IN ADULT, UNSPECIFIED OBESITY TYPE (HCC): ICD-10-CM

## 2023-02-01 DIAGNOSIS — R03.0 ELEVATED BLOOD PRESSURE READING WITHOUT DIAGNOSIS OF HYPERTENSION: ICD-10-CM

## 2023-02-01 DIAGNOSIS — E78.5 HYPERLIPIDEMIA, UNSPECIFIED HYPERLIPIDEMIA TYPE: ICD-10-CM

## 2023-02-01 DIAGNOSIS — I35.8 AORTIC VALVE SCLEROSIS: ICD-10-CM

## 2023-02-01 DIAGNOSIS — Z12.11 SCREEN FOR COLON CANCER: ICD-10-CM

## 2023-02-01 DIAGNOSIS — E11.65 TYPE 2 DIABETES MELLITUS WITH HYPERGLYCEMIA, WITHOUT LONG-TERM CURRENT USE OF INSULIN (HCC): ICD-10-CM

## 2023-02-01 DIAGNOSIS — E11.65 TYPE 2 DIABETES MELLITUS WITH HYPERGLYCEMIA, WITHOUT LONG-TERM CURRENT USE OF INSULIN (HCC): Primary | ICD-10-CM

## 2023-02-01 PROBLEM — K21.9 GASTROESOPHAGEAL REFLUX DISEASE WITHOUT ESOPHAGITIS: Status: ACTIVE | Noted: 2018-10-04

## 2023-02-01 PROBLEM — I48.91 ATRIAL FIBRILLATION (HCC): Status: RESOLVED | Noted: 2018-10-04 | Resolved: 2023-02-01

## 2023-02-01 PROBLEM — K64.9 HEMORRHOIDS: Status: RESOLVED | Noted: 2018-10-04 | Resolved: 2023-02-01

## 2023-02-01 RX ORDER — TIMOLOL MALEATE 5 MG/ML
SOLUTION/ DROPS OPHTHALMIC
COMMUNITY
Start: 2023-01-10

## 2023-02-01 NOTE — PROGRESS NOTES
Andrew Rose is a 75 y.o. female  Chief Complaint   Patient presents with    Follow-up     Visit Vitals  /74 (BP 1 Location: Left upper arm, BP Patient Position: Sitting, BP Cuff Size: Adult)   Pulse 68   Temp 98.6 °F (37 °C) (Temporal)   Resp 16   Ht 5' 5\" (1.651 m)   Wt 225 lb (102.1 kg)   SpO2 96%   BMI 37.44 kg/m²          HPI  F/U     Diabetes- GLIPIZIDE SR 10 mg  Checks BS at home in am. On average 125. Tried to get Jardience. Insurance would not cover. Labs today. History of elevated BP- Stable / NOT on current medication    Obesity- weight- discussed addition of medication for DM that helps with weight    No other concerns today    HX- Atrial fibrillation- resolved after stopped heavy alcohol . Does not have cardiologist since moving 2 yrs ago. Woman health PAP- needs  COLONOSCOPY/ ADDRESS TODAY  Mammogram done [facility] last month  Previous Bone density normal.     ROS  Review of Systems   Constitutional:  Negative for chills and fever. Respiratory:  Negative for cough and shortness of breath. Cardiovascular:  Negative for chest pain. Gastrointestinal:  Negative for constipation and diarrhea. Genitourinary:  Negative for frequency. Musculoskeletal:  Positive for myalgias. Neurological:  Positive for tingling. Negative for dizziness and headaches. Endo/Heme/Allergies:  Negative for polydipsia. Psychiatric/Behavioral:  Negative for depression. Past Medical History:   Diagnosis Date    Diabetes ([address]     type 2      Past Surgical History:   Procedure Laterality Date    HX APPENDECTOMY      HX CHOLECYSTECTOMY      HX TONSILLECTOMY         EXAM  Physical Exam  Vitals and nursing note reviewed. Constitutional:       Appearance: Normal appearance. She is obese. HENT:      Head: Normocephalic and atraumatic. Nose: Nose normal.      Mouth/Throat:      Mouth: Mucous membranes are moist.   Eyes:      Pupils: Pupils are equal, round, and reactive to light. Cardiovascular:      Rate and Rhythm: Normal rate and regular rhythm. Pulses: Normal pulses. Heart sounds: Normal heart sounds. Pulmonary:      Effort: Pulmonary effort is normal.      Breath sounds: Normal breath sounds. Musculoskeletal:         General: Normal range of motion. Cervical back: Normal range of motion. No rigidity. Skin:     General: Skin is warm. Capillary Refill: Capillary refill takes less than 2 seconds. Neurological:      General: No focal deficit present. Mental Status: She is alert. Psychiatric:         Mood and Affect: Mood normal.     ASSESSMENT/PLAN    ICD-10-CM ICD-9-CM    1. Type 2 diabetes mellitus with hyperglycemia, without long-term current use of insulin (HCC)  E11.65 250.00 Continue current medication  Check BS daily  Labs today       790.29       2. Elevated blood pressure reading without diagnosis of hypertension  R03.0 796.2 Normal BP reading      3.  Screen for colon cancer  Z12.11 V76.51 REFERRAL TO GASTROENTEROLOGY   4.  AV sclerosis/ murmur                                                   consider cardiology follow up for these   Patient will schedule Women's health exam  Labs today  AMW visit / please schedule 3 months  Consider establishing with cardiology  Signed By: Rebecka Babinski, FNP     February 1, 2023

## 2023-02-02 ENCOUNTER — PATIENT MESSAGE (OUTPATIENT)
Dept: INTERNAL MEDICINE CLINIC | Age: 68
End: 2023-02-02

## 2023-02-02 LAB
ALBUMIN SERPL-MCNC: 3.8 G/DL (ref 3.5–5)
ALBUMIN/GLOB SERPL: 1.1 (ref 1.1–2.2)
ALP SERPL-CCNC: 116 U/L (ref 45–117)
ALT SERPL-CCNC: 31 U/L (ref 12–78)
ANION GAP SERPL CALC-SCNC: 6 MMOL/L (ref 5–15)
AST SERPL-CCNC: 20 U/L (ref 15–37)
BASOPHILS # BLD: 0 K/UL (ref 0–0.1)
BASOPHILS NFR BLD: 1 % (ref 0–1)
BILIRUB SERPL-MCNC: 0.3 MG/DL (ref 0.2–1)
BUN SERPL-MCNC: 13 MG/DL (ref 6–20)
BUN/CREAT SERPL: 18 (ref 12–20)
CALCIUM SERPL-MCNC: 9.7 MG/DL (ref 8.5–10.1)
CHLORIDE SERPL-SCNC: 103 MMOL/L (ref 97–108)
CHOLEST SERPL-MCNC: 206 MG/DL
CO2 SERPL-SCNC: 31 MMOL/L (ref 21–32)
CREAT SERPL-MCNC: 0.74 MG/DL (ref 0.55–1.02)
DIFFERENTIAL METHOD BLD: ABNORMAL
EOSINOPHIL # BLD: 0.2 K/UL (ref 0–0.4)
EOSINOPHIL NFR BLD: 3 % (ref 0–7)
ERYTHROCYTE [DISTWIDTH] IN BLOOD BY AUTOMATED COUNT: 12.7 % (ref 11.5–14.5)
GLOBULIN SER CALC-MCNC: 3.4 G/DL (ref 2–4)
GLUCOSE SERPL-MCNC: 98 MG/DL (ref 65–100)
HCT VFR BLD AUTO: 43.8 % (ref 35–47)
HDLC SERPL-MCNC: 48 MG/DL
HDLC SERPL: 4.3 (ref 0–5)
HGB BLD-MCNC: 14 G/DL (ref 11.5–16)
IMM GRANULOCYTES # BLD AUTO: 0 K/UL (ref 0–0.04)
IMM GRANULOCYTES NFR BLD AUTO: 0 % (ref 0–0.5)
LDLC SERPL CALC-MCNC: 118.8 MG/DL (ref 0–100)
LYMPHOCYTES # BLD: 2.7 K/UL (ref 0.8–3.5)
LYMPHOCYTES NFR BLD: 50 % (ref 12–49)
MCH RBC QN AUTO: 27.8 PG (ref 26–34)
MCHC RBC AUTO-ENTMCNC: 32 G/DL (ref 30–36.5)
MCV RBC AUTO: 86.9 FL (ref 80–99)
MONOCYTES # BLD: 0.4 K/UL (ref 0–1)
MONOCYTES NFR BLD: 7 % (ref 5–13)
NEUTS SEG # BLD: 2.1 K/UL (ref 1.8–8)
NEUTS SEG NFR BLD: 39 % (ref 32–75)
NRBC # BLD: 0 K/UL (ref 0–0.01)
NRBC BLD-RTO: 0 PER 100 WBC
PLATELET # BLD AUTO: 180 K/UL (ref 150–400)
PMV BLD AUTO: 11.9 FL (ref 8.9–12.9)
POTASSIUM SERPL-SCNC: 4 MMOL/L (ref 3.5–5.1)
PROT SERPL-MCNC: 7.2 G/DL (ref 6.4–8.2)
RBC # BLD AUTO: 5.04 M/UL (ref 3.8–5.2)
SODIUM SERPL-SCNC: 140 MMOL/L (ref 136–145)
TRIGL SERPL-MCNC: 196 MG/DL (ref ?–150)
VLDLC SERPL CALC-MCNC: 39.2 MG/DL
WBC # BLD AUTO: 5.4 K/UL (ref 3.6–11)

## 2023-02-13 ENCOUNTER — OFFICE VISIT (OUTPATIENT)
Dept: INTERNAL MEDICINE CLINIC | Age: 68
End: 2023-02-13
Payer: MEDICARE

## 2023-02-13 VITALS
BODY MASS INDEX: 36.49 KG/M2 | TEMPERATURE: 97.6 F | RESPIRATION RATE: 16 BRPM | SYSTOLIC BLOOD PRESSURE: 111 MMHG | HEIGHT: 65 IN | WEIGHT: 219 LBS | DIASTOLIC BLOOD PRESSURE: 64 MMHG | HEART RATE: 78 BPM | OXYGEN SATURATION: 96 %

## 2023-02-13 DIAGNOSIS — Z12.4 SCREENING FOR MALIGNANT NEOPLASM OF CERVIX: Primary | ICD-10-CM

## 2023-02-13 PROCEDURE — G9899 SCRN MAM PERF RSLTS DOC: HCPCS | Performed by: PHYSICIAN ASSISTANT

## 2023-02-13 PROCEDURE — 99213 OFFICE O/P EST LOW 20 MIN: CPT | Performed by: PHYSICIAN ASSISTANT

## 2023-02-13 PROCEDURE — 3017F COLORECTAL CA SCREEN DOC REV: CPT | Performed by: PHYSICIAN ASSISTANT

## 2023-02-13 PROCEDURE — 1090F PRES/ABSN URINE INCON ASSESS: CPT | Performed by: PHYSICIAN ASSISTANT

## 2023-02-13 PROCEDURE — G8432 DEP SCR NOT DOC, RNG: HCPCS | Performed by: PHYSICIAN ASSISTANT

## 2023-02-13 PROCEDURE — G8427 DOCREV CUR MEDS BY ELIG CLIN: HCPCS | Performed by: PHYSICIAN ASSISTANT

## 2023-02-13 PROCEDURE — G8536 NO DOC ELDER MAL SCRN: HCPCS | Performed by: PHYSICIAN ASSISTANT

## 2023-02-13 PROCEDURE — G8417 CALC BMI ABV UP PARAM F/U: HCPCS | Performed by: PHYSICIAN ASSISTANT

## 2023-02-13 PROCEDURE — 1101F PT FALLS ASSESS-DOCD LE1/YR: CPT | Performed by: PHYSICIAN ASSISTANT

## 2023-02-13 PROCEDURE — G8400 PT W/DXA NO RESULTS DOC: HCPCS | Performed by: PHYSICIAN ASSISTANT

## 2023-02-13 NOTE — PROGRESS NOTES
Nahomy Garcia is a 75 y.o. female  Chief Complaint   Patient presents with    Well Woman     No concerns - not fasting      Visit Vitals  /64 (BP 1 Location: Left upper arm, BP Patient Position: Sitting, BP Cuff Size: Adult)   Pulse 78   Temp 97.6 °F (36.4 °C) (Temporal)   Resp 16   Ht 5' 5\" (1.651 m)   Wt 219 lb (99.3 kg)   SpO2 96%   BMI 36.44 kg/m²      Health Maintenance Due   Topic Date Due    Eye Exam Retinal or Dilated  Never done    DTaP/Tdap/Td series (1 - Tdap) Never done    Colorectal Cancer Screening Combo  Never done    Shingles Vaccine (1 of 2) Never done    COVID-19 Vaccine (2 - Moderna series) 11/05/2021    Diabetic Alb to Cr ratio (uACR) test  01/27/2023    Medicare Yearly Exam  01/28/2023       HPI  [place] Zoran, ENRIQUE's patient in to see me today for a pap smear. No concerns. Last pap years ago. Not sexually active. ROS  Review of Systems   Respiratory:  Negative for shortness of breath. Cardiovascular:  Negative for chest pain and palpitations. Genitourinary:  Negative for dysuria, frequency and urgency. Denies vaginal discharge   Neurological:  Negative for dizziness, loss of consciousness and weakness. EXAM  Physical Exam  Vitals and nursing note reviewed. Constitutional:       General: She is not in acute distress. Appearance: She is well-developed. HENT:      Head: Normocephalic and atraumatic. Neck:      Vascular: No JVD. Cardiovascular:      Rate and Rhythm: Normal rate and regular rhythm. Heart sounds: Normal heart sounds. Pulmonary:      Effort: Pulmonary effort is normal. No respiratory distress. Breath sounds: Normal breath sounds. Genitourinary:     Comments: Cervix WNL. No CMT  Musculoskeletal:      Cervical back: Neck supple. Skin:     General: Skin is warm and dry. Neurological:      Mental Status: She is alert and oriented to person, place, and time.    Psychiatric:         Mood and Affect: Mood normal. Behavior: Behavior normal.         Thought Content:  Thought content normal.         Judgment: Judgment normal.     ASSESSMENT/PLAN  Encounter Diagnoses     ICD-10-CM ICD-9-CM   1. Screening for malignant neoplasm of cervix  Z12.4 V76.2     Orders Placed This Encounter    PAP IG, APTIMA HPV AND RFX 16/18,45 ([id number])

## 2023-03-20 ENCOUNTER — OFFICE VISIT (OUTPATIENT)
Dept: INTERNAL MEDICINE CLINIC | Age: 68
End: 2023-03-20
Payer: MEDICARE

## 2023-03-20 VITALS
SYSTOLIC BLOOD PRESSURE: 148 MMHG | RESPIRATION RATE: 18 BRPM | DIASTOLIC BLOOD PRESSURE: 88 MMHG | HEART RATE: 74 BPM | OXYGEN SATURATION: 96 % | HEIGHT: 65 IN | WEIGHT: 227 LBS | TEMPERATURE: 97.4 F | BODY MASS INDEX: 37.82 KG/M2

## 2023-03-20 DIAGNOSIS — E11.65 TYPE 2 DIABETES MELLITUS WITH HYPERGLYCEMIA, WITHOUT LONG-TERM CURRENT USE OF INSULIN (HCC): ICD-10-CM

## 2023-03-20 DIAGNOSIS — E66.01 SEVERE OBESITY (BMI 35.0-39.9) WITH COMORBIDITY (HCC): ICD-10-CM

## 2023-03-20 DIAGNOSIS — H91.92 HEARING LOSS OF LEFT EAR, UNSPECIFIED HEARING LOSS TYPE: ICD-10-CM

## 2023-03-20 DIAGNOSIS — R03.0 ELEVATED BLOOD PRESSURE READING WITHOUT DIAGNOSIS OF HYPERTENSION: ICD-10-CM

## 2023-03-20 DIAGNOSIS — Z71.89 ACP (ADVANCE CARE PLANNING): ICD-10-CM

## 2023-03-20 DIAGNOSIS — Z12.11 COLON CANCER SCREENING: ICD-10-CM

## 2023-03-20 DIAGNOSIS — Z00.00 MEDICARE ANNUAL WELLNESS VISIT, SUBSEQUENT: Primary | ICD-10-CM

## 2023-03-20 LAB
EST. AVERAGE GLUCOSE BLD GHB EST-MCNC: 160 MG/DL
HBA1C MFR BLD: 7.2 % (ref 4–5.6)

## 2023-03-20 PROCEDURE — G9899 SCRN MAM PERF RSLTS DOC: HCPCS | Performed by: NURSE PRACTITIONER

## 2023-03-20 PROCEDURE — G8400 PT W/DXA NO RESULTS DOC: HCPCS | Performed by: NURSE PRACTITIONER

## 2023-03-20 PROCEDURE — G8427 DOCREV CUR MEDS BY ELIG CLIN: HCPCS | Performed by: NURSE PRACTITIONER

## 2023-03-20 PROCEDURE — 1101F PT FALLS ASSESS-DOCD LE1/YR: CPT | Performed by: NURSE PRACTITIONER

## 2023-03-20 PROCEDURE — G8536 NO DOC ELDER MAL SCRN: HCPCS | Performed by: NURSE PRACTITIONER

## 2023-03-20 PROCEDURE — 3046F HEMOGLOBIN A1C LEVEL >9.0%: CPT | Performed by: NURSE PRACTITIONER

## 2023-03-20 PROCEDURE — G8417 CALC BMI ABV UP PARAM F/U: HCPCS | Performed by: NURSE PRACTITIONER

## 2023-03-20 PROCEDURE — 99213 OFFICE O/P EST LOW 20 MIN: CPT | Performed by: NURSE PRACTITIONER

## 2023-03-20 PROCEDURE — 1090F PRES/ABSN URINE INCON ASSESS: CPT | Performed by: NURSE PRACTITIONER

## 2023-03-20 PROCEDURE — G8432 DEP SCR NOT DOC, RNG: HCPCS | Performed by: NURSE PRACTITIONER

## 2023-03-20 PROCEDURE — G0439 PPPS, SUBSEQ VISIT: HCPCS | Performed by: NURSE PRACTITIONER

## 2023-03-20 PROCEDURE — 3017F COLORECTAL CA SCREEN DOC REV: CPT | Performed by: NURSE PRACTITIONER

## 2023-03-20 PROCEDURE — 2022F DILAT RTA XM EVC RTNOPTHY: CPT | Performed by: NURSE PRACTITIONER

## 2023-03-20 NOTE — PROGRESS NOTES
Collin Medina is a 75 y.o. female  Chief Complaint   Patient presents with    Annual Wellness Visit     Visit Vitals  BP (!) 157/84   Pulse 74   Temp 97.4 °F (36.3 °C) (Temporal)   Resp 18   Ht 5' 5\" (1.651 m)   Wt 227 lb (103 kg)   SpO2 96%   BMI 37.77 kg/m²          HPI  Follow up HTN/ Diabetes  LABS This is the Subsequent Medicare Annual Wellness Exam, performed 12 months or more after the Initial AWV or the last Subsequent AWV  Health Maintenance Due   Topic Date Due    Eye Exam Retinal or Dilated  Never done    DTaP/Tdap/Td series (1 - Tdap) Never done    Colorectal Cancer Screening Combo  Never done    Shingles Vaccine (1 of 2) Never done    COVID-19 Vaccine (2 - Moderna series) 11/05/2021    Diabetic Alb to Cr ratio (uACR) test  01/27/2023    Medicare Yearly Exam  01/28/2023     Depression Risk Factor Screening:     3 most recent PHQ Screens 3/20/2023   Little interest or pleasure in doing things Not at all   Feeling down, depressed, irritable, or hopeless Not at all   Total Score PHQ 2 0       Abuse Screen  Patient is not abused    Fall Risk  Fall Risk Assessment, last 12 mths 3/20/2023   Able to walk? Yes   Fall in past 12 months? 0   Do you feel unsteady? -   Are you worried about falling -   Is TUG test greater than 12 seconds? -   Is the gait abnormal? -   Number of falls in past 12 months -   Fall with injury? -       Alcohol Risk Factor Screening:    reports current alcohol use. Limited on weekend. Mixed drink    Pt is  not taking Opioids   Current Outpatient Medications   Medication Sig Dispense Refill    timolol (TIMOPTIC) 0.5 % ophthalmic solution INSTILL 1 DROP INTO EACH EYE TWICE DAILY      glipiZIDE SR (GLUCOTROL XL) 10 mg CR tablet Take 1 Tablet by mouth daily. 90 Tablet 4    glucose blood VI test strips (OneTouch Verio test strips) strip Use to measure blood sugar on average 3 times per day. 300 Strip 3    multivitamin (ONE A DAY) tablet Take 1 Tablet by mouth daily.       garlic (GARLIQUE PO) Take  by mouth.      levocetirizine (XYZAL) 5 mg tablet Xyzal 5 mg tablet   Take 1 tablet every day by oral route. acetaminophen (TYLENOL) 325 mg tablet Take  by mouth every four (4) hours as needed for Pain. Functional Ability and Level of Safety:   Hearing Loss  Hearing is good. Family says can't hear.    Audiology    Activities of Daily Living  The home contains: handrails  Patient does total self care     Cognitive Screening   Evaluation of Cognitive Function:  Has your family/caregiver stated any concerns about your memory: no     Patient Care Team   Patient Care Team:  ENRIQUE Bahena as PCP - General (Nurse Practitioner)  ENRIQUE Bahena as PCP - [facility] Empaneled Provider  Felicia Cuadra MD as Physician (Orthopedic Surgery)    Assessment/Plan   Education and counseling provided:  Are appropriate based on today's review and evaluation    Extended Emergency Contact Information  Primary Emergency Contact: Yeimi Barrios  Mobile Phone: [phone]  Relation: Daughter  Secondary Emergency Contact: Haroon Quevedo  Mobile Phone: [phone]  Relation: Daughter    ACP - wants referral This is the Subsequent Medicare Annual Wellness Exam, performed 12 months or more after the Initial AWV or the last Subsequent AWV  Health Maintenance Due   Topic Date Due    Eye Exam Retinal or Dilated  Never done Has eye specialist    DTaP/Tdap/Td series (1 - Tdap) Never done    Colorectal Cancer Screening Combo  Never done  Referral given last OV    Shingles Vaccine (1 of 2) Never done DECLINE    COVID-19 Vaccine (2 - Gisella Kerr series) 11/05/2021    Diabetic Alb to Cr ratio (uACR) test  01/27/2023    Medicare Yearly Exam  01/28/2023     Depression Risk Factor Screening:     3 most recent PHQ Screens 3/20/2023   Little interest or pleasure in doing things Not at all   Feeling down, depressed, irritable, or hopeless Not at all   Total Score PHQ 2 0       Abuse Screen  Patient is not abused    Fall Risk  Fall Risk Assessment, last 12 mths 3/20/2023   Able to walk? Yes   Fall in past 12 months? 0   Do you feel unsteady? -   Are you worried about falling -   Is TUG test greater than 12 seconds? -   Is the gait abnormal? -   Number of falls in past 12 months -   Fall with injury? -       Alcohol Risk Factor Screening:    reports current alcohol use. Weekends a mixed drink    Pt is  not taking Opioids   Current Outpatient Medications   Medication Sig Dispense Refill    timolol (TIMOPTIC) 0.5 % ophthalmic solution INSTILL 1 DROP INTO EACH EYE TWICE DAILY      glipiZIDE SR (GLUCOTROL XL) 10 mg CR tablet Take 1 Tablet by mouth daily. 90 Tablet 4    glucose blood VI test strips (OneTouch Verio test strips) strip Use to measure blood sugar on average 3 times per day. 300 Strip 3    multivitamin (ONE A DAY) tablet Take 1 Tablet by mouth daily. garlic (GARLIQUE PO) Take  by mouth.      levocetirizine (XYZAL) 5 mg tablet Xyzal 5 mg tablet   Take 1 tablet every day by oral route. acetaminophen (TYLENOL) 325 mg tablet Take  by mouth every four (4) hours as needed for Pain. Functional Ability and Level of Safety:   Hearing Loss  Hearing is good. Some hearing loss.  Would like testing    Activities of Daily Living  The home contains: handrails  Patient does total self care     Cognitive Screening   Evaluation of Cognitive Function:  Has your family/caregiver stated any concerns about your memory: no     Patient Care Team   Patient Care Team:  ENRIQUE Bowers as PCP - General (Nurse Practitioner)  ENRIQUE Bowers as PCP [facility]  Mahi Shay MD as Physician (Orthopedic Surgery)    Assessment/Plan   Education and counseling provided:  Are appropriate based on today's review and evaluation    Extended Emergency Contact Information  Primary Emergency Contact: [address]  Mobile Phone: [phone]  Relation: Daughter  Secondary Emergency Contact: [address]  Mobile Phone: [phone]  Relation: Daughter    ACP Would like ACP call for Advance Directive    I have reviewed the patient's medical history in detail and updated the computerized patient record. History     Past Medical History:   Diagnosis Date    Diabetes ([facility]     type 2       Past Surgical History:   Procedure Laterality Date    HX APPENDECTOMY      HX CHOLECYSTECTOMY      HX TONSILLECTOMY       Allergies   Allergen Reactions    Penicillins Anaphylaxis and Hives    Pravastatin Myalgia     Family History   Problem Relation Age of Onset    Diabetes Mother     Diabetes Father     Heart Disease Father     Heart Disease Paternal Uncle      Social History     Tobacco Use    Smoking status: Former     Packs/day: 0.50     Years: 30.00     Pack years: 15.00     Types: Cigarettes     Quit date: 2015     Years since quittin.2    Smokeless tobacco: Never   Substance Use Topics    Alcohol use: Yes     Comment: drinks 4-5 drinks on the weekends      Patient Active Problem List   Diagnosis Code    Type 2 diabetes mellitus, without long-term current use of insulin ([facility]) E11.9    Myalgia due to statin M79.10, T46.6X5A    Hyperlipidemia E78.5    Aortic valve sclerosis I35.8    Class 2 severe obesity with serious comorbidity and body mass index (BMI) of 36.0 to 36.9 in adult ([facility] E66.01, Z68.36    Arthritis of left shoulder region M19.012    Arthritis M19.90    Chronic rhinitis J31.0    Elevated BP without diagnosis of hypertension R03.0    Heart murmur R01.1    Pain in right knee M25.561    Primary osteoarthritis involving multiple joints M15.9    Gastroesophageal reflux disease without esophagitis K21.9         I have reviewed the patient's medical history in detail and updated the computerized patient record.      History         Allergies   Allergen Reactions    Penicillins Anaphylaxis and Hives    Pravastatin Myalgia     Family History   Problem Relation Age of Onset    Diabetes Mother Diabetes Father     Heart Disease Father     Heart Disease Paternal Uncle      Social History     Tobacco Use    Smoking status: Former     Packs/day: 0.50     Years: 30.00     Pack years: 15.00     Types: Cigarettes     Quit date: 2015     Years since quittin.2    Smokeless tobacco: Never   Substance Use Topics    Alcohol use: Yes     Comment: drinks 4-5 drinks on the weekends        Patient is following up for recent labs. Elevated blood pressure- patient BP elevated. Declines treatment with ACE. Discussed kidney protection. Mixed hyperlipidemia- refuses a statin. Slightly above normal range. Lab Results   Component Value Date/Time    Cholesterol, total 206 (H) 2023 08:19 AM    HDL Cholesterol 48 2023 08:19 AM    LDL, calculated 118.8 (H) 2023 08:19 AM    VLDL, calculated 39.2 2023 08:19 AM    Triglyceride 196 (H) 2023 08:19 AM    CHOL/HDL Ratio 4.3 2023 08:19 AM     Diabetes - A 1 c today. Lab Results   Component Value Date/Time    Hemoglobin A1c 9.4 (H) 2022 11:56 AM    Hemoglobin A1c (POC) 6.8 2022 01:45 PM   Glipizide SR    ROS  Review of Systems   Constitutional:  Negative for chills and fever. HENT:  Positive for hearing loss. Respiratory:  Negative for cough and shortness of breath. Cardiovascular:  Negative for chest pain and palpitations. Gastrointestinal:  Negative for abdominal pain, nausea and vomiting. Musculoskeletal:  Negative for myalgias. Neurological:  Negative for dizziness and headaches. Psychiatric/Behavioral:  Negative for depression. EXAM  Physical Exam  Vitals and nursing note reviewed. Constitutional:       Appearance: Normal appearance. She is obese. HENT:      Head: Normocephalic. Eyes:      Pupils: Pupils are equal, round, and reactive to light. Cardiovascular:      Rate and Rhythm: Normal rate and regular rhythm. Pulses: Normal pulses. Heart sounds: Normal heart sounds.    Pulmonary: Effort: Pulmonary effort is normal.      Breath sounds: Normal breath sounds. Musculoskeletal:         General: Normal range of motion. Cervical back: Normal range of motion. Skin:     General: Skin is warm. Neurological:      General: No focal deficit present. Mental Status: She is alert. Mental status is at baseline. Psychiatric:         Mood and Affect: Mood normal.     ASSESSMENT/PLAN    ICD-10-CM ICD-9-CM    1. Medicare annual wellness visit, subsequent  Z00.00 V70.0       2. ACP (advance care planning)  Z71.89 V65.49 REFERRAL TO ACP CLINICAL SPECIALIST      3. Severe obesity (BMI 35.0-39. 9) with comorbidity ([address]  E66.01 278.01       4. Colon cancer screening  Z12.11 V76.51 REFERRAL TO GASTROENTEROLOGY      5. Hearing loss of left ear, unspecified hearing loss type  H91.92 389.9 REFERRAL TO AUDIOLOGY      6. Type 2 diabetes mellitus with hyperglycemia, without long-term current use of insulin (HCC)  E11.65 250.00 HEMOGLOBIN A1C WITH EAG     790.29       7. Elevated blood pressure reading without diagnosis of hypertension  R03.0 796.2 Refuses start of BP medication.  Will monitor at home      Return 6 months  Lab today  Referrals  Signed By: ENRIQUE Torres     March 20, 2023

## 2023-03-20 NOTE — PATIENT INSTRUCTIONS
Medicare Wellness Visit, Female     The best way to live healthy is to have a lifestyle where you eat a well-balanced diet, exercise regularly, limit alcohol use, and quit all forms of tobacco/nicotine, if applicable. Regular preventive services are another way to keep healthy. Preventive services (vaccines, screening tests, monitoring & exams) can help personalize your care plan, which helps you manage your own care. Screening tests can find health problems at the earliest stages, when they are easiest to treat. [facility] follows the current, evidence-based guidelines published by the [facility] ([facility]) when recommending preventive services for our patients. Because we follow these guidelines, sometimes recommendations change over time as research supports it. (For example, mammograms used to be recommended annually. Even though Medicare will still pay for an annual mammogram, the newer guidelines recommend a mammogram every two years for women of average risk). Of course, you and your doctor may decide to screen more often for some diseases, based on your risk and your co-morbidities (chronic disease you are already diagnosed with). Preventive services for you include:  - Medicare offers their members a free annual wellness visit, which is time for you and your primary care provider to discuss and plan for your preventive service needs.  Take advantage of this benefit every year!    -Over the age of 72 should receive the recommended pneumonia vaccines.    -All adults should have a flu vaccine yearly.  -All adults should have a tetanus vaccine every 10 years.   -Over the age 48 should receive the shingles vaccines.        -All adults should be screened once for Hepatitis C.  -All adults age 38-68 who are overweight should have a diabetes screening test once every three years.   -Other screening tests and preventive services for persons with diabetes include: an eye exam to screen for diabetic retinopathy, a kidney function test, a foot exam, and stricter control over your cholesterol.   -Cardiovascular screening for adults with routine risk involves an electrocardiogram (ECG) at intervals determined by your doctor.     -Colorectal cancer screenings should be done for adults age 39-70 with no increased risk factors for colorectal cancer. There are a number of acceptable methods of screening for this type of cancer. Each test has its own benefits and drawbacks. Discuss with your doctor what is most appropriate for you during your annual wellness visit. The different tests include: colonoscopy (considered the best screening method), a fecal occult blood test, a fecal DNA test, and sigmoidoscopy.    -Lung cancer screening is recommended annually with a low dose CT scan for adults between age 54 and 68, who have smoked at least 30 pack years (equivalent of 1 pack per day for 30 days), and who is a current smoker or quit less than 15 years ago.    -A bone mass density test is recommended when a woman turns 65 to screen for osteoporosis. This test is only recommended one time, as a screening. Some providers will use this same test as a disease monitoring tool if you already have osteoporosis. -Breast cancer screenings are recommended every other year for women of normal risk, age 54-69.    -Cervical cancer screenings for women over age 72 are only recommended with certain risk factors.      Here is a list of your current Health Maintenance items (your personalized list of preventive services) with a due date:  Health Maintenance Due   Topic Date Due    Eye Exam  Never done    DTaP/Tdap/Td  (1 - Tdap) Never done    Colorectal Screening  Never done    Shingles Vaccine (1 of 2) Never done    COVID-19 Vaccine (2 - Moderna series) 11/05/2021    URINE CHECK FOR KIDNEY PROBLEMS  01/27/2023    Annual Well Visit  01/28/2023

## 2023-03-20 NOTE — PROGRESS NOTES
Carlos Alberto Low is a 75 y.o. female    Chief Complaint   Patient presents with    Annual Wellness Visit       Visit Vitals  BP (!) 157/84   Pulse 74   Temp 97.4 °F (36.3 °C) (Temporal)   Resp 18   Ht 5' 5\" (1.651 m)   Wt 227 lb (103 kg)   LMP  (LMP Unknown)   SpO2 96%   BMI 37.77 kg/m²       3 most recent PHQ Screens 3/20/2023   Little interest or pleasure in doing things Not at all   Feeling down, depressed, irritable, or hopeless Not at all   Total Score PHQ 2 0       Fall Risk Assessment, last 12 mths 3/20/2023   Able to walk? Yes   Fall in past 12 months? 0   Do you feel unsteady? -   Are you worried about falling -   Is TUG test greater than 12 seconds? -   Is the gait abnormal? -   Number of falls in past 12 months -   Fall with injury? -       Abuse Screening Questionnaire 3/20/2023   Do you ever feel afraid of your partner? N   Are you in a relationship with someone who physically or mentally threatens you? N   Is it safe for you to go home? Y       1. Have you been to the ER, urgent care clinic since your last visit? Hospitalized since your last visit? No     2. Have you seen or consulted any other health care providers outside of the [address] [place] [address] since your last visit? Include any pap smears or colon screening.  No

## 2023-03-21 ENCOUNTER — DOCUMENTATION ONLY (OUTPATIENT)
Dept: CASE MANAGEMENT | Age: 68
End: 2023-03-21

## 2023-03-21 NOTE — ACP (ADVANCE CARE PLANNING)
Advance Care Planning   Ambulatory ACP Specialist Patient Outreach    Date:  3/21/2023    ACP Specialist:  Krish Bond LMSW    Outreach call to patient in follow-up to ACP Specialist referral from:    [x] PCP  [] Provider   [] Ambulatory Care Management [] Other     For:                  [x] Advance Directive Assistance              [] Complete Portable DNR order              [] Complete POST/POLST/MOST              [] Code Status Discussion             [] Discuss Goals of Care             [] Early ACP Decision-Making              [] Other (Specify)    Date Referral Received:3/20/2023    Outreaches:       [] 1st -  Date:  3/21/2023                              Intervention:  [x] Spoke with Patient  By: [] Phone [] Left Voice mail [] Email / Mail    [] [employer]  [] Other (Specify) : Outcomes:SW called and spoke with pt who said that she would like to move forward with an ACP conversation on 3/24 @10 AM. This SW will send pt ACP info to review prior to her appt with ANASTASIIA Dorman. [] 2nd -  Date:                                Intervention:  [] Spoke with Patient  By: [] Phone [] Left Voice mail [] Email / Mail    [] [employer]  [] Other [id number] : Outcomes:                [] 3rd -  Date:                               Intervention:  [] Spoke with Patient  By: [] Phone [] Left Voice mail [] Email / Mail    [] [email or web address]  [] Other [id number] : Outcomes:           []  Additional Outreach -  Date:     (Specify Dates & special circumstances): Outcomes:       Next Step:   [x] ACP scheduled conversation  [] Outreach again in one week               [x] Email / Mail ACP Info Sheets  [x] Email / Mail Advance Directive   [] Closing referral.  Routing closure to referring provider/staff and to ACP Specialist . [] Closure letter mailed to patient with invitation to contact ACP Specialist if / when ready.    [] Other (Specify here):       Thank you for this referral.

## 2023-03-24 ENCOUNTER — DOCUMENTATION ONLY (OUTPATIENT)
Dept: CASE MANAGEMENT | Age: 68
End: 2023-03-24

## 2023-03-24 NOTE — ACP (ADVANCE CARE PLANNING)
Advance Care Planning     Advance Care Planning Clinical Specialist  Conversation Note      Date of ACP Conversation: 03/24/23    Conversation Conducted with:  Patient with Decision Making Capacity    ACP Clinical Specialist: Peyton Raygoza LCSW    Health Care Decision Maker:    Current Designated Health Care Decision Maker:     Primary Decision Maker: Lacho Spencer - Daughter - [phone]    Secondary Decision Maker: Jolene Jenkins - Daughter - [phone]    Today we reviewed role of HCDM and assisted pt with completing HCPOA. Pt has named her two daughters as outlined above. Pt has talked with her daughters about her wishes for future health care decisions. Care Preferences    Hospitalization: \"If your health worsens and it becomes clear that your chance of recovery is unlikely, what would your preference be regarding hospitalization? \"    Choice:  [x]  The patient wants hospitalization  []  The patient prefers comfort-focused treatment without hospitalization. Ventilation: \"If you were in your present state of health and suddenly became very ill and were unable to breathe on your own, what would your preference be about the use of a ventilator (breathing machine) if it were available to you? \"      If patient would desire the use of a ventilator (breathing machine), answer \"yes\", if not \"no\":yes    \"If your health worsens and it becomes clear that your chance of recovery is unlikely, what would your preference be about the use of a ventilator (breathing machine) if it were available to you? \"     Would the patient desire the use of a ventilator (breathing machine)? NO; reviewed [address] Directive today and assisted pt with completing that document. Resuscitation  \"CPR works best to restart the heart when there is a sudden event, like a heart attack, in someone who is otherwise healthy.  Unfortunately, CPR does not typically restart the heart for people who have serious health conditions or who are very sick. \"    \"In the event your heart stopped as a result of an underlying serious health condition, would you want attempts to be made to restart your heart (answer \"yes\" for attempt to resuscitate) or would you prefer a natural death (answer \"no\" for do not attempt to resuscitate)? \" Pt would not want life-sustaining treatment if in a terminal, irreversible condition. [x] Yes  [] No   Educated Patient / [place] Grandchild regarding differences between Advance Directives and portable DNR orders. Length of ACP Conversation in minutes:  40    Conversation Outcomes:  [x] ACP discussion completed  [] Existing advance directive reviewed with patient; no changes to patient's previously recorded wishes   [x] New Advance Directive completed   [] Portable Do Not Resuscitate prepared for Provider review and signature  [] POLST/POST/MOLST/MOST prepared for Provider review and signature      Follow-up plan:    [] Schedule follow-up conversation to continue planning  [] Referred individual to Provider for additional questions/concerns   [x] Advised patient/agent/surrogate to review completed ACP document and update if needed with changes in condition, patient preferences or care setting     [x] This note routed to one or more involved healthcare providers  Summary:  Met with pt today to offer ACP education and support. Explored pt's understanding of the ACP process; pt shared she understands this to be a discussion about \"if I can't make a decision or if I am at the end. \" Reviewed the role of HCDM and reviewed in detail the appointing of an agent as outlined in the [place]. As outlined above, pt does wish to name her daughters as her agents. Pt shared her daughter is a nurse and that she knows her daughters understands her medical wishes for the future as they have discussed this in detail.  Pt also shared her daughters understand her tyson system and will respect those wishes as well (i.e no blood products). Care preferences as outlined above and reviewed pt's healthcare instructions. Assisted pt with completing the [address] [place] Directive as pt stated \"I do not want things to continue if I am at end of life. \" Pt did share she does not want blood products as she follows the Yarsanism beliefs. Pt shared she does plan to complete a 'blood card' that her [address] [place] [address] will provide to her. Pt was sent the completed [address] [place] Directive for review and signature via [employer]. Pt had no other questions at this time. Encouraged pt to reach out to this SW as needed. Will continue to follow and to assist with docusign. ADDENDUM:  Received signed copy of Advance Directive and placed into pt's medical record. Confirmed pt received final copy and has the ability to print this. No other needs per pt. Thanked pt again for her time. The referral can be closed.

## 2023-08-09 ENCOUNTER — TELEPHONE (OUTPATIENT)
Age: 68
End: 2023-08-09

## 2023-08-09 SDOH — ECONOMIC STABILITY: FOOD INSECURITY: WITHIN THE PAST 12 MONTHS, YOU WORRIED THAT YOUR FOOD WOULD RUN OUT BEFORE YOU GOT MONEY TO BUY MORE.: SOMETIMES TRUE

## 2023-08-09 SDOH — ECONOMIC STABILITY: HOUSING INSECURITY
IN THE LAST 12 MONTHS, WAS THERE A TIME WHEN YOU DID NOT HAVE A STEADY PLACE TO SLEEP OR SLEPT IN A SHELTER (INCLUDING NOW)?: NO

## 2023-08-09 SDOH — ECONOMIC STABILITY: INCOME INSECURITY: HOW HARD IS IT FOR YOU TO PAY FOR THE VERY BASICS LIKE FOOD, HOUSING, MEDICAL CARE, AND HEATING?: NOT VERY HARD

## 2023-08-09 SDOH — ECONOMIC STABILITY: FOOD INSECURITY: WITHIN THE PAST 12 MONTHS, THE FOOD YOU BOUGHT JUST DIDN'T LAST AND YOU DIDN'T HAVE MONEY TO GET MORE.: SOMETIMES TRUE

## 2023-08-09 SDOH — ECONOMIC STABILITY: TRANSPORTATION INSECURITY
IN THE PAST 12 MONTHS, HAS LACK OF TRANSPORTATION KEPT YOU FROM MEETINGS, WORK, OR FROM GETTING THINGS NEEDED FOR DAILY LIVING?: NO

## 2023-08-09 NOTE — TELEPHONE ENCOUNTER
----- Message from Elodia Franco sent at 8/9/2023  1:49 PM EDT -----  Subject: Message to Provider    QUESTIONS  Information for Provider? Pt is requesting medical records be sent to [facility]   for endocrinologist; FAX TO NUMBER PROVIDED: [phone] ATTN? Fernanda Loza  ---------------------------------------------------------------------------  --------------  Marium Hilton [facility]  [phone]; OK to leave message on voicemail  ---------------------------------------------------------------------------  --------------  SCRIPT ANSWERS  Relationship to Patient?  Self

## 2023-08-10 ENCOUNTER — OFFICE VISIT (OUTPATIENT)
Age: 68
End: 2023-08-10
Payer: MEDICARE

## 2023-08-10 VITALS
RESPIRATION RATE: 19 BRPM | HEART RATE: 79 BPM | OXYGEN SATURATION: 98 % | BODY MASS INDEX: 36.99 KG/M2 | SYSTOLIC BLOOD PRESSURE: 130 MMHG | HEIGHT: 65 IN | WEIGHT: 222 LBS | TEMPERATURE: 98.1 F | DIASTOLIC BLOOD PRESSURE: 71 MMHG

## 2023-08-10 DIAGNOSIS — B35.6 FUNGAL INFECTION OF THE GROIN: Primary | ICD-10-CM

## 2023-08-10 DIAGNOSIS — L30.9 DERMATITIS: ICD-10-CM

## 2023-08-10 PROBLEM — I10 HYPERTENSIVE DISORDER: Status: ACTIVE | Noted: 2018-10-04

## 2023-08-10 PROCEDURE — 99213 OFFICE O/P EST LOW 20 MIN: CPT | Performed by: NURSE PRACTITIONER

## 2023-08-10 RX ORDER — NYSTATIN 100000 [USP'U]/G
POWDER TOPICAL
Qty: 30 G | Refills: 2 | Status: SHIPPED | OUTPATIENT
Start: 2023-08-10

## 2023-08-10 NOTE — PROGRESS NOTES
I have reviewed all needed documentation in preparation for visit. Verified patient by name and date of birth  Chief Complaint   Patient presents with    Rash     Start a week ago- No concerns       Vitals:    08/10/23 0830   Resp: 19   SpO2: 98%   Height: 5' 5\" (1.651 m)       Health Maintenance Due   Topic Date Due    DTaP/Tdap/Td vaccine (1 - Tdap) Never done    Colorectal Cancer Screen  Never done    Shingles vaccine (1 of 2) Never done    DEXA (modify frequency per FRAX score)  Never done    COVID-19 Vaccine (2 - Booster for Moderna series) 12/03/2021    Diabetic Alb to Cr ratio (uACR) test  01/27/2023    Diabetic retinal exam  Never done    Flu vaccine (1) 08/01/2023       1. \"Have you been to the ER, urgent care clinic since your last visit? Hospitalized since your last visit? \" NO    2. \"Have you seen or consulted any other health care providers outside of the [address] [place] [address] since your last visit? \" YES    3. For patients aged 43-73: Has the patient had a colonoscopy / FIT/ Cologuard? NO      If the patient is female:    4. For patients aged 43-66: Has the patient had a mammogram within the past 2 years? YES      5. For patients aged 21-65: Has the patient had a pap smear?  FARIDA Mcarthur CMA

## 2023-11-21 ENCOUNTER — HOSPITAL ENCOUNTER (OUTPATIENT)
Facility: HOSPITAL | Age: 68
Setting detail: OUTPATIENT SURGERY
Discharge: HOME | End: 2023-11-21
Attending: INTERNAL MEDICINE | Admitting: INTERNAL MEDICINE
Payer: MEDICARE

## 2023-11-21 ENCOUNTER — ANESTHESIA (OUTPATIENT)
Facility: HOSPITAL | Age: 68
End: 2023-11-21
Payer: MEDICARE

## 2023-11-21 ENCOUNTER — ANESTHESIA EVENT (OUTPATIENT)
Facility: HOSPITAL | Age: 68
End: 2023-11-21
Payer: MEDICARE

## 2023-11-21 VITALS
HEART RATE: 77 BPM | TEMPERATURE: 98.5 F | BODY MASS INDEX: 36.39 KG/M2 | OXYGEN SATURATION: 95 % | SYSTOLIC BLOOD PRESSURE: 123 MMHG | RESPIRATION RATE: 24 BRPM | WEIGHT: 218.4 LBS | HEIGHT: 65 IN | DIASTOLIC BLOOD PRESSURE: 79 MMHG

## 2023-11-21 PROCEDURE — 3700000000 HC ANESTHESIA ATTENDED CARE: Performed by: INTERNAL MEDICINE

## 2023-11-21 PROCEDURE — 2500000003 HC RX 250 WO HCPCS: Performed by: NURSE ANESTHETIST, CERTIFIED REGISTERED

## 2023-11-21 PROCEDURE — 6360000002 HC RX W HCPCS: Performed by: NURSE ANESTHETIST, CERTIFIED REGISTERED

## 2023-11-21 PROCEDURE — 6370000000 HC RX 637 (ALT 250 FOR IP): Performed by: INTERNAL MEDICINE

## 2023-11-21 PROCEDURE — 3700000001 HC ADD 15 MINUTES (ANESTHESIA): Performed by: INTERNAL MEDICINE

## 2023-11-21 PROCEDURE — 3600007512: Performed by: INTERNAL MEDICINE

## 2023-11-21 PROCEDURE — 7100000011 HC PHASE II RECOVERY - ADDTL 15 MIN: Performed by: INTERNAL MEDICINE

## 2023-11-21 PROCEDURE — 3600007502: Performed by: INTERNAL MEDICINE

## 2023-11-21 PROCEDURE — 7100000010 HC PHASE II RECOVERY - FIRST 15 MIN: Performed by: INTERNAL MEDICINE

## 2023-11-21 PROCEDURE — 2580000003 HC RX 258: Performed by: INTERNAL MEDICINE

## 2023-11-21 PROCEDURE — 88305 TISSUE EXAM BY PATHOLOGIST: CPT

## 2023-11-21 PROCEDURE — 2709999900 HC NON-CHARGEABLE SUPPLY: Performed by: INTERNAL MEDICINE

## 2023-11-21 RX ORDER — SODIUM CHLORIDE 0.9 % (FLUSH) 0.9 %
5-40 SYRINGE (ML) INJECTION EVERY 12 HOURS SCHEDULED
Status: DISCONTINUED | OUTPATIENT
Start: 2023-11-21 | End: 2023-11-21 | Stop reason: HOSPADM

## 2023-11-21 RX ORDER — SIMETHICONE 20 MG/.3ML
EMULSION ORAL PRN
Status: DISCONTINUED | OUTPATIENT
Start: 2023-11-21 | End: 2023-11-21 | Stop reason: ALTCHOICE

## 2023-11-21 RX ORDER — SODIUM CHLORIDE 0.9 % (FLUSH) 0.9 %
5-40 SYRINGE (ML) INJECTION PRN
Status: DISCONTINUED | OUTPATIENT
Start: 2023-11-21 | End: 2023-11-21 | Stop reason: HOSPADM

## 2023-11-21 RX ORDER — SODIUM CHLORIDE 9 MG/ML
INJECTION, SOLUTION INTRAVENOUS CONTINUOUS
Status: DISCONTINUED | OUTPATIENT
Start: 2023-11-21 | End: 2023-11-21 | Stop reason: HOSPADM

## 2023-11-21 RX ORDER — SODIUM CHLORIDE 9 MG/ML
25 INJECTION, SOLUTION INTRAVENOUS PRN
Status: DISCONTINUED | OUTPATIENT
Start: 2023-11-21 | End: 2023-11-21 | Stop reason: HOSPADM

## 2023-11-21 RX ADMIN — PROPOFOL 60 MG: 10 INJECTION, EMULSION INTRAVENOUS at 08:04

## 2023-11-21 RX ADMIN — SODIUM CHLORIDE: 9 INJECTION, SOLUTION INTRAVENOUS at 07:51

## 2023-11-21 RX ADMIN — PROPOFOL 50 MG: 10 INJECTION, EMULSION INTRAVENOUS at 08:14

## 2023-11-21 RX ADMIN — PROPOFOL 50 MG: 10 INJECTION, EMULSION INTRAVENOUS at 08:10

## 2023-11-21 RX ADMIN — LIDOCAINE HYDROCHLORIDE 50 MG: 20 INJECTION, SOLUTION EPIDURAL; INFILTRATION; INTRACAUDAL; PERINEURAL at 08:04

## 2023-11-21 RX ADMIN — PROPOFOL 50 MG: 10 INJECTION, EMULSION INTRAVENOUS at 08:19

## 2023-11-21 RX ADMIN — PROPOFOL 40 MG: 10 INJECTION, EMULSION INTRAVENOUS at 08:07

## 2023-11-21 ASSESSMENT — PAIN - FUNCTIONAL ASSESSMENT: PAIN_FUNCTIONAL_ASSESSMENT: NONE - DENIES PAIN

## 2023-11-21 NOTE — OP NOTE
[facility]  [address], [address] [facility]      Colonoscopy Operative Report    Oralia Lora  [id number]  1955      Procedure Type:   Colonoscopy with polypectomy (snare cautery)     Indications:    Personal history of colon polyps (screening only)       Pre-operative Diagnosis: see indication above    Post-operative Diagnosis:  See findings below    :  Malcolm Dobbs MD    Surgical Assistant: Circulator: Mary Thacker RN  Endoscopy Technician: Shruti Delgadillo    Implants:  None    Referring Provider: GERONIMO Madrigal      Sedation:  MAC anesthesia Propofol      Procedure Details:  After informed consent was obtained with all risks and benefits of procedure explained and preoperative exam completed, the patient was taken to the endoscopy suite and placed in the left lateral decubitus position. Upon sequential sedation as per above, a digital rectal exam was performed demonstrating internal hemorrhoids. The Olympus videocolonoscope  was inserted in the rectum and carefully advanced to the cecum, which was identified by the ileocecal valve and appendiceal orifice. The cecum was identified by the ileocecal valve and appendiceal orifice. The quality of preparation was good. The colonoscope was slowly withdrawn with careful evaluation between folds. Retroflexion in the rectum was completed . Findings:   Rectum: normal  Sigmoid: mild diverticulosis    1 cm sessile polyp removed by hot biopsies  Descending Colon: normal   Transverse Colon: 1 cm sessile polyp, removed by hot biopsies    Ascending Colon: 1.5 cm sessile polyp removed by hot snaring   Cecum: 8 mm sessile polyp, removed by hot biopsies    1.5 cm sessile polyp, removed by hot snaring, one hemoclip placed at site to prevent any bleeding       Specimen Removed:   as above     Complications: None. EBL:  None. Impression:     see findings     Recommendations: --Await pathology.

## 2023-11-21 NOTE — DISCHARGE INSTRUCTIONS
[employer]. Care instructions adapted under license by your healthcare professional. If you have questions about a medical condition or this instruction, always ask your healthcare professional. [address] any warranty or liability for your use of this information.

## 2023-11-21 NOTE — H&P
EXT-no edema     Data Review     No results for input(s): \"WBC\", \"HGB\", \"HCT\", \"PLT\" in the last 72 hours. No results for input(s): \"NA\", \"K\", \"CL\", \"CO2\", \"BUN\", \"CREA\", \"GLU\", \"PHOS\", \"CA\" in the last 72 hours. No results for input(s): \"TP\", \"ALB\", \"GLOB\", \"GGT\", \"AML\" in the last 72 hours. Invalid input(s): \"SGOT\", \"GPT\", \"AP\", \"TBIL\", \"AMYP\", \"LPSE\", \"HLPSE\"  No results for input(s): \"INR\", \"APTT\" in the last 72 hours.     Invalid input(s): \"PTP\"       Assessment:     Screening colonoscopy   H/o colon polyps     Patient Active Problem List   Diagnosis    Myalgia due to statin    Hyperlipidemia    Arthritis of left shoulder region    Type 2 diabetes mellitus, without long-term current use of insulin ([facility])    Class 2 severe obesity with serious comorbidity and body mass index (BMI) of 36.0 to 36.9 in adult [facility])    Aortic valve sclerosis    Arthritis    Chronic rhinitis    Elevated BP without diagnosis of hypertension    Heart murmur    Pain in right knee    Primary osteoarthritis involving multiple joints    Gastroesophageal reflux disease without esophagitis    Hypertensive disorder         Plan:     Endoscopic procedure with sedation     Signed By: Cisco Neff MD     11/21/2023  7:58 AM

## 2023-11-21 NOTE — PROGRESS NOTES
.post Attending Attestation (For Attendings USE Only)... Topical Sulfur Applications Pregnancy And Lactation Text: This medication is considered safe during pregnancy and breast feeding secondary to limited systemic absorption.

## 2024-02-19 LAB — HBA1C MFR BLD HPLC: 8.2 %

## 2024-06-04 ENCOUNTER — TRANSCRIBE ORDERS (OUTPATIENT)
Facility: HOSPITAL | Age: 69
End: 2024-06-04

## 2024-06-04 DIAGNOSIS — Z12.31 VISIT FOR SCREENING MAMMOGRAM: Primary | ICD-10-CM

## 2024-06-24 ENCOUNTER — HOSPITAL ENCOUNTER (OUTPATIENT)
Facility: HOSPITAL | Age: 69
Discharge: HOME | End: 2024-06-27
Payer: MEDICARE

## 2024-06-24 VITALS — BODY MASS INDEX: 34.99 KG/M2 | HEIGHT: 65 IN | WEIGHT: 210 LBS

## 2024-06-24 DIAGNOSIS — Z12.31 VISIT FOR SCREENING MAMMOGRAM: ICD-10-CM

## 2024-06-24 PROCEDURE — 77063 BREAST TOMOSYNTHESIS BI: CPT

## 2025-06-09 ENCOUNTER — TRANSCRIBE ORDERS (OUTPATIENT)
Facility: HOSPITAL | Age: 70
End: 2025-06-09

## 2025-06-09 DIAGNOSIS — Z12.31 OTHER SCREENING MAMMOGRAM: Primary | ICD-10-CM

## 2025-06-25 ENCOUNTER — HOSPITAL ENCOUNTER (OUTPATIENT)
Facility: HOSPITAL | Age: 70
Discharge: HOME | End: 2025-06-28
Payer: MEDICARE

## 2025-06-25 VITALS — BODY MASS INDEX: 34.16 KG/M2 | WEIGHT: 205 LBS | HEIGHT: 65 IN

## 2025-06-25 DIAGNOSIS — Z12.31 OTHER SCREENING MAMMOGRAM: ICD-10-CM

## 2025-06-25 PROCEDURE — 77063 BREAST TOMOSYNTHESIS BI: CPT

## (undated) DEVICE — Device

## (undated) DEVICE — CLIP RESOLUTION 360

## (undated) DEVICE — FORCEP BX 2.8 MM 2.2 MMX100 CM PULM STD CAP RADIAL JAW 4

## (undated) DEVICE — SNARE ENDOSCP L240CM LOOP W27MM SHTH DIA2.4MM WRK CHN 2.8MM

## (undated) DEVICE — POLYP TRAP: Brand: TRAPEASE®